# Patient Record
Sex: MALE | Race: ASIAN | NOT HISPANIC OR LATINO | URBAN - METROPOLITAN AREA
[De-identification: names, ages, dates, MRNs, and addresses within clinical notes are randomized per-mention and may not be internally consistent; named-entity substitution may affect disease eponyms.]

---

## 2019-09-21 ENCOUNTER — EMERGENCY (EMERGENCY)
Facility: HOSPITAL | Age: 54
LOS: 1 days | Discharge: HOME | End: 2019-09-21
Attending: EMERGENCY MEDICINE
Payer: MEDICAID

## 2019-09-21 VITALS
HEART RATE: 126 BPM | TEMPERATURE: 103 F | OXYGEN SATURATION: 94 % | RESPIRATION RATE: 18 BRPM | WEIGHT: 207.01 LBS | DIASTOLIC BLOOD PRESSURE: 78 MMHG | SYSTOLIC BLOOD PRESSURE: 122 MMHG

## 2019-09-21 VITALS
HEART RATE: 99 BPM | SYSTOLIC BLOOD PRESSURE: 108 MMHG | OXYGEN SATURATION: 99 % | DIASTOLIC BLOOD PRESSURE: 67 MMHG | TEMPERATURE: 99 F | RESPIRATION RATE: 18 BRPM

## 2019-09-21 LAB
ALBUMIN SERPL ELPH-MCNC: 3.8 G/DL — SIGNIFICANT CHANGE UP (ref 3.5–5)
ALP SERPL-CCNC: 153 U/L — HIGH (ref 40–120)
ALT FLD-CCNC: 43 U/L DA — SIGNIFICANT CHANGE UP (ref 10–60)
ANION GAP SERPL CALC-SCNC: 8 MMOL/L — SIGNIFICANT CHANGE UP (ref 5–17)
APPEARANCE UR: ABNORMAL
APTT BLD: 29.9 SEC — SIGNIFICANT CHANGE UP (ref 27.5–36.3)
AST SERPL-CCNC: 29 U/L — SIGNIFICANT CHANGE UP (ref 10–40)
BASOPHILS # BLD AUTO: 0.03 K/UL — SIGNIFICANT CHANGE UP (ref 0–0.2)
BASOPHILS NFR BLD AUTO: 0.4 % — SIGNIFICANT CHANGE UP (ref 0–2)
BILIRUB SERPL-MCNC: 1.1 MG/DL — SIGNIFICANT CHANGE UP (ref 0.2–1.2)
BILIRUB UR-MCNC: NEGATIVE — SIGNIFICANT CHANGE UP
BUN SERPL-MCNC: 8 MG/DL — SIGNIFICANT CHANGE UP (ref 7–18)
CALCIUM SERPL-MCNC: 8.6 MG/DL — SIGNIFICANT CHANGE UP (ref 8.4–10.5)
CHLORIDE SERPL-SCNC: 106 MMOL/L — SIGNIFICANT CHANGE UP (ref 96–108)
CO2 SERPL-SCNC: 22 MMOL/L — SIGNIFICANT CHANGE UP (ref 22–31)
COLOR SPEC: YELLOW — SIGNIFICANT CHANGE UP
CREAT SERPL-MCNC: 1.21 MG/DL — SIGNIFICANT CHANGE UP (ref 0.5–1.3)
DIFF PNL FLD: ABNORMAL
EOSINOPHIL # BLD AUTO: 0.03 K/UL — SIGNIFICANT CHANGE UP (ref 0–0.5)
EOSINOPHIL NFR BLD AUTO: 0.4 % — SIGNIFICANT CHANGE UP (ref 0–6)
GLUCOSE SERPL-MCNC: 110 MG/DL — HIGH (ref 70–99)
GLUCOSE UR QL: NEGATIVE — SIGNIFICANT CHANGE UP
HCT VFR BLD CALC: 43.4 % — SIGNIFICANT CHANGE UP (ref 39–50)
HGB BLD-MCNC: 15.1 G/DL — SIGNIFICANT CHANGE UP (ref 13–17)
IMM GRANULOCYTES NFR BLD AUTO: 1.2 % — SIGNIFICANT CHANGE UP (ref 0–1.5)
INR BLD: 1.12 RATIO — SIGNIFICANT CHANGE UP (ref 0.88–1.16)
KETONES UR-MCNC: NEGATIVE — SIGNIFICANT CHANGE UP
LACTATE SERPL-SCNC: 1.9 MMOL/L — SIGNIFICANT CHANGE UP (ref 0.7–2)
LACTATE SERPL-SCNC: 2.4 MMOL/L — HIGH (ref 0.7–2)
LEUKOCYTE ESTERASE UR-ACNC: ABNORMAL
LIDOCAIN IGE QN: 205 U/L — SIGNIFICANT CHANGE UP (ref 73–393)
LYMPHOCYTES # BLD AUTO: 0.58 K/UL — LOW (ref 1–3.3)
LYMPHOCYTES # BLD AUTO: 7 % — LOW (ref 13–44)
MCHC RBC-ENTMCNC: 30.5 PG — SIGNIFICANT CHANGE UP (ref 27–34)
MCHC RBC-ENTMCNC: 34.8 GM/DL — SIGNIFICANT CHANGE UP (ref 32–36)
MCV RBC AUTO: 87.7 FL — SIGNIFICANT CHANGE UP (ref 80–100)
MONOCYTES # BLD AUTO: 0.06 K/UL — SIGNIFICANT CHANGE UP (ref 0–0.9)
MONOCYTES NFR BLD AUTO: 0.7 % — LOW (ref 2–14)
NEUTROPHILS # BLD AUTO: 7.43 K/UL — HIGH (ref 1.8–7.4)
NEUTROPHILS NFR BLD AUTO: 90.3 % — HIGH (ref 43–77)
NITRITE UR-MCNC: NEGATIVE — SIGNIFICANT CHANGE UP
NRBC # BLD: 0 /100 WBCS — SIGNIFICANT CHANGE UP (ref 0–0)
PH UR: 5 — SIGNIFICANT CHANGE UP (ref 5–8)
PLATELET # BLD AUTO: 200 K/UL — SIGNIFICANT CHANGE UP (ref 150–400)
POTASSIUM SERPL-MCNC: 3.5 MMOL/L — SIGNIFICANT CHANGE UP (ref 3.5–5.3)
POTASSIUM SERPL-SCNC: 3.5 MMOL/L — SIGNIFICANT CHANGE UP (ref 3.5–5.3)
PROT SERPL-MCNC: 8 G/DL — SIGNIFICANT CHANGE UP (ref 6–8.3)
PROT UR-MCNC: 30 MG/DL
PROTHROM AB SERPL-ACNC: 12.5 SEC — SIGNIFICANT CHANGE UP (ref 10–12.9)
RBC # BLD: 4.95 M/UL — SIGNIFICANT CHANGE UP (ref 4.2–5.8)
RBC # FLD: 12.3 % — SIGNIFICANT CHANGE UP (ref 10.3–14.5)
SODIUM SERPL-SCNC: 136 MMOL/L — SIGNIFICANT CHANGE UP (ref 135–145)
SP GR SPEC: 1.01 — SIGNIFICANT CHANGE UP (ref 1.01–1.02)
UROBILINOGEN FLD QL: NEGATIVE — SIGNIFICANT CHANGE UP
WBC # BLD: 8.23 K/UL — SIGNIFICANT CHANGE UP (ref 3.8–10.5)
WBC # FLD AUTO: 8.23 K/UL — SIGNIFICANT CHANGE UP (ref 3.8–10.5)

## 2019-09-21 PROCEDURE — 99285 EMERGENCY DEPT VISIT HI MDM: CPT

## 2019-09-21 PROCEDURE — 71045 X-RAY EXAM CHEST 1 VIEW: CPT | Mod: 26

## 2019-09-21 RX ORDER — SODIUM CHLORIDE 9 MG/ML
2900 INJECTION INTRAMUSCULAR; INTRAVENOUS; SUBCUTANEOUS ONCE
Refills: 0 | Status: COMPLETED | OUTPATIENT
Start: 2019-09-21 | End: 2019-09-21

## 2019-09-21 RX ORDER — IBUPROFEN 200 MG
600 TABLET ORAL ONCE
Refills: 0 | Status: COMPLETED | OUTPATIENT
Start: 2019-09-21 | End: 2019-09-21

## 2019-09-21 RX ORDER — CEFEPIME 1 G/1
2000 INJECTION, POWDER, FOR SOLUTION INTRAMUSCULAR; INTRAVENOUS ONCE
Refills: 0 | Status: COMPLETED | OUTPATIENT
Start: 2019-09-21 | End: 2019-09-21

## 2019-09-21 RX ORDER — ACETAMINOPHEN 500 MG
650 TABLET ORAL ONCE
Refills: 0 | Status: DISCONTINUED | OUTPATIENT
Start: 2019-09-21 | End: 2019-09-21

## 2019-09-21 RX ORDER — VANCOMYCIN HCL 1 G
1000 VIAL (EA) INTRAVENOUS ONCE
Refills: 0 | Status: COMPLETED | OUTPATIENT
Start: 2019-09-21 | End: 2019-09-21

## 2019-09-21 RX ADMIN — SODIUM CHLORIDE 2900 MILLILITER(S): 9 INJECTION INTRAMUSCULAR; INTRAVENOUS; SUBCUTANEOUS at 22:14

## 2019-09-21 RX ADMIN — SODIUM CHLORIDE 2900 MILLILITER(S): 9 INJECTION INTRAMUSCULAR; INTRAVENOUS; SUBCUTANEOUS at 18:57

## 2019-09-21 RX ADMIN — Medication 1000 MILLIGRAM(S): at 20:13

## 2019-09-21 RX ADMIN — Medication 250 MILLIGRAM(S): at 18:56

## 2019-09-21 RX ADMIN — Medication 600 MILLIGRAM(S): at 22:13

## 2019-09-21 RX ADMIN — CEFEPIME 2000 MILLIGRAM(S): 1 INJECTION, POWDER, FOR SOLUTION INTRAMUSCULAR; INTRAVENOUS at 22:13

## 2019-09-21 RX ADMIN — Medication 600 MILLIGRAM(S): at 18:57

## 2019-09-21 RX ADMIN — CEFEPIME 100 MILLIGRAM(S): 1 INJECTION, POWDER, FOR SOLUTION INTRAMUSCULAR; INTRAVENOUS at 20:15

## 2019-09-21 NOTE — ED PROVIDER NOTE - PROGRESS NOTE DETAILS
Kunz: work up neg.  lactate improve.  blood in urine s/p biopsy.  hemodynamically stable.  pt po  dx fever.  f/u with pcp. left ambulatory.  return precautions given.

## 2019-09-21 NOTE — ED PROVIDER NOTE - PATIENT PORTAL LINK FT
You can access the FollowMyHealth Patient Portal offered by [facility] by registering at the following website: [email or web address]. By joining [facility]’s FollowMyHealth portal, you will also be able to view your health information using other applications (apps) compatible with our system.

## 2019-09-22 ENCOUNTER — INPATIENT (INPATIENT)
Facility: HOSPITAL | Age: 54
LOS: 3 days | Discharge: HOME | DRG: 862 | End: 2019-09-26
Attending: INTERNAL MEDICINE | Admitting: INTERNAL MEDICINE
Payer: COMMERCIAL

## 2019-09-22 VITALS
HEART RATE: 111 BPM | SYSTOLIC BLOOD PRESSURE: 132 MMHG | RESPIRATION RATE: 22 BRPM | OXYGEN SATURATION: 97 % | TEMPERATURE: 103 F | DIASTOLIC BLOOD PRESSURE: 77 MMHG | WEIGHT: 207.01 LBS | HEIGHT: 66 IN

## 2019-09-22 DIAGNOSIS — Z29.9 ENCOUNTER FOR PROPHYLACTIC MEASURES, UNSPECIFIED: ICD-10-CM

## 2019-09-22 DIAGNOSIS — R78.81 BACTEREMIA: ICD-10-CM

## 2019-09-22 DIAGNOSIS — M10.9 GOUT, UNSPECIFIED: ICD-10-CM

## 2019-09-22 DIAGNOSIS — E78.5 HYPERLIPIDEMIA, UNSPECIFIED: ICD-10-CM

## 2019-09-22 DIAGNOSIS — N42.9 DISORDER OF PROSTATE, UNSPECIFIED: ICD-10-CM

## 2019-09-22 LAB
ALBUMIN SERPL ELPH-MCNC: 3.4 G/DL — LOW (ref 3.5–5)
ALP SERPL-CCNC: 100 U/L — SIGNIFICANT CHANGE UP (ref 40–120)
ALT FLD-CCNC: 59 U/L DA — SIGNIFICANT CHANGE UP (ref 10–60)
ANION GAP SERPL CALC-SCNC: 7 MMOL/L — SIGNIFICANT CHANGE UP (ref 5–17)
APPEARANCE UR: CLEAR — SIGNIFICANT CHANGE UP
APTT BLD: 34.4 SEC — SIGNIFICANT CHANGE UP (ref 27.5–36.3)
AST SERPL-CCNC: 45 U/L — HIGH (ref 10–40)
BACTERIA # UR AUTO: ABNORMAL /HPF
BASOPHILS # BLD AUTO: 0.04 K/UL — SIGNIFICANT CHANGE UP (ref 0–0.2)
BASOPHILS NFR BLD AUTO: 0.3 % — SIGNIFICANT CHANGE UP (ref 0–2)
BILIRUB SERPL-MCNC: 2.1 MG/DL — HIGH (ref 0.2–1.2)
BILIRUB UR-MCNC: NEGATIVE — SIGNIFICANT CHANGE UP
BUN SERPL-MCNC: 11 MG/DL — SIGNIFICANT CHANGE UP (ref 7–18)
CALCIUM SERPL-MCNC: 8.1 MG/DL — LOW (ref 8.4–10.5)
CHLORIDE SERPL-SCNC: 104 MMOL/L — SIGNIFICANT CHANGE UP (ref 96–108)
CO2 SERPL-SCNC: 24 MMOL/L — SIGNIFICANT CHANGE UP (ref 22–31)
COLOR SPEC: YELLOW — SIGNIFICANT CHANGE UP
CREAT SERPL-MCNC: 1.27 MG/DL — SIGNIFICANT CHANGE UP (ref 0.5–1.3)
DIFF PNL FLD: ABNORMAL
E COLI DNA BLD POS QL NAA+NON-PROBE: SIGNIFICANT CHANGE UP
EOSINOPHIL # BLD AUTO: 0.03 K/UL — SIGNIFICANT CHANGE UP (ref 0–0.5)
EOSINOPHIL NFR BLD AUTO: 0.2 % — SIGNIFICANT CHANGE UP (ref 0–6)
EPI CELLS # UR: ABNORMAL /HPF
GLUCOSE SERPL-MCNC: 107 MG/DL — HIGH (ref 70–99)
GLUCOSE UR QL: NEGATIVE — SIGNIFICANT CHANGE UP
GRAM STN FLD: SIGNIFICANT CHANGE UP
HCT VFR BLD CALC: 40.3 % — SIGNIFICANT CHANGE UP (ref 39–50)
HGB BLD-MCNC: 13.8 G/DL — SIGNIFICANT CHANGE UP (ref 13–17)
IMM GRANULOCYTES NFR BLD AUTO: 0.5 % — SIGNIFICANT CHANGE UP (ref 0–1.5)
INR BLD: 1.44 RATIO — HIGH (ref 0.88–1.16)
KETONES UR-MCNC: NEGATIVE — SIGNIFICANT CHANGE UP
LACTATE SERPL-SCNC: 1.5 MMOL/L — SIGNIFICANT CHANGE UP (ref 0.7–2)
LEUKOCYTE ESTERASE UR-ACNC: ABNORMAL
LYMPHOCYTES # BLD AUTO: 1.54 K/UL — SIGNIFICANT CHANGE UP (ref 1–3.3)
LYMPHOCYTES # BLD AUTO: 11.1 % — LOW (ref 13–44)
MCHC RBC-ENTMCNC: 30.3 PG — SIGNIFICANT CHANGE UP (ref 27–34)
MCHC RBC-ENTMCNC: 34.2 GM/DL — SIGNIFICANT CHANGE UP (ref 32–36)
MCV RBC AUTO: 88.4 FL — SIGNIFICANT CHANGE UP (ref 80–100)
METHOD TYPE: SIGNIFICANT CHANGE UP
MONOCYTES # BLD AUTO: 1.17 K/UL — HIGH (ref 0–0.9)
MONOCYTES NFR BLD AUTO: 8.4 % — SIGNIFICANT CHANGE UP (ref 2–14)
NEUTROPHILS # BLD AUTO: 11.06 K/UL — HIGH (ref 1.8–7.4)
NEUTROPHILS NFR BLD AUTO: 79.5 % — HIGH (ref 43–77)
NITRITE UR-MCNC: NEGATIVE — SIGNIFICANT CHANGE UP
NRBC # BLD: 0 /100 WBCS — SIGNIFICANT CHANGE UP (ref 0–0)
PH UR: 6 — SIGNIFICANT CHANGE UP (ref 5–8)
PLATELET # BLD AUTO: 149 K/UL — LOW (ref 150–400)
POTASSIUM SERPL-MCNC: 3.5 MMOL/L — SIGNIFICANT CHANGE UP (ref 3.5–5.3)
POTASSIUM SERPL-SCNC: 3.5 MMOL/L — SIGNIFICANT CHANGE UP (ref 3.5–5.3)
PROT SERPL-MCNC: 7.5 G/DL — SIGNIFICANT CHANGE UP (ref 6–8.3)
PROT UR-MCNC: NEGATIVE — SIGNIFICANT CHANGE UP
PROTHROM AB SERPL-ACNC: 16.2 SEC — HIGH (ref 10–12.9)
RBC # BLD: 4.56 M/UL — SIGNIFICANT CHANGE UP (ref 4.2–5.8)
RBC # FLD: 12.7 % — SIGNIFICANT CHANGE UP (ref 10.3–14.5)
RBC CASTS # UR COMP ASSIST: ABNORMAL /HPF (ref 0–2)
SODIUM SERPL-SCNC: 135 MMOL/L — SIGNIFICANT CHANGE UP (ref 135–145)
SP GR SPEC: 1 — LOW (ref 1.01–1.02)
SPECIMEN SOURCE: SIGNIFICANT CHANGE UP
UROBILINOGEN FLD QL: NEGATIVE — SIGNIFICANT CHANGE UP
WBC # BLD: 13.91 K/UL — HIGH (ref 3.8–10.5)
WBC # FLD AUTO: 13.91 K/UL — HIGH (ref 3.8–10.5)
WBC UR QL: ABNORMAL /HPF (ref 0–5)

## 2019-09-22 PROCEDURE — 87040 BLOOD CULTURE FOR BACTERIA: CPT

## 2019-09-22 PROCEDURE — 85610 PROTHROMBIN TIME: CPT

## 2019-09-22 PROCEDURE — 99284 EMERGENCY DEPT VISIT MOD MDM: CPT | Mod: 25

## 2019-09-22 PROCEDURE — 87150 DNA/RNA AMPLIFIED PROBE: CPT

## 2019-09-22 PROCEDURE — 96365 THER/PROPH/DIAG IV INF INIT: CPT

## 2019-09-22 PROCEDURE — 99285 EMERGENCY DEPT VISIT HI MDM: CPT

## 2019-09-22 PROCEDURE — 83605 ASSAY OF LACTIC ACID: CPT

## 2019-09-22 PROCEDURE — 36415 COLL VENOUS BLD VENIPUNCTURE: CPT

## 2019-09-22 PROCEDURE — 96367 TX/PROPH/DG ADDL SEQ IV INF: CPT

## 2019-09-22 PROCEDURE — 81001 URINALYSIS AUTO W/SCOPE: CPT

## 2019-09-22 PROCEDURE — 87186 SC STD MICRODIL/AGAR DIL: CPT

## 2019-09-22 PROCEDURE — 83690 ASSAY OF LIPASE: CPT

## 2019-09-22 PROCEDURE — 93005 ELECTROCARDIOGRAM TRACING: CPT

## 2019-09-22 PROCEDURE — 85027 COMPLETE CBC AUTOMATED: CPT

## 2019-09-22 PROCEDURE — 80053 COMPREHEN METABOLIC PANEL: CPT

## 2019-09-22 PROCEDURE — 85730 THROMBOPLASTIN TIME PARTIAL: CPT

## 2019-09-22 PROCEDURE — 87086 URINE CULTURE/COLONY COUNT: CPT

## 2019-09-22 PROCEDURE — 71045 X-RAY EXAM CHEST 1 VIEW: CPT

## 2019-09-22 RX ORDER — ACETAMINOPHEN 500 MG
650 TABLET ORAL EVERY 6 HOURS
Refills: 0 | Status: DISCONTINUED | OUTPATIENT
Start: 2019-09-22 | End: 2019-09-26

## 2019-09-22 RX ORDER — AMPICILLIN SODIUM AND SULBACTAM SODIUM 250; 125 MG/ML; MG/ML
3 INJECTION, POWDER, FOR SUSPENSION INTRAMUSCULAR; INTRAVENOUS ONCE
Refills: 0 | Status: COMPLETED | OUTPATIENT
Start: 2019-09-22 | End: 2019-09-22

## 2019-09-22 RX ORDER — CEFTRIAXONE 500 MG/1
1000 INJECTION, POWDER, FOR SOLUTION INTRAMUSCULAR; INTRAVENOUS EVERY 24 HOURS
Refills: 0 | Status: DISCONTINUED | OUTPATIENT
Start: 2019-09-22 | End: 2019-09-24

## 2019-09-22 RX ORDER — SODIUM CHLORIDE 9 MG/ML
2900 INJECTION INTRAMUSCULAR; INTRAVENOUS; SUBCUTANEOUS ONCE
Refills: 0 | Status: COMPLETED | OUTPATIENT
Start: 2019-09-22 | End: 2019-09-22

## 2019-09-22 RX ORDER — ENOXAPARIN SODIUM 100 MG/ML
40 INJECTION SUBCUTANEOUS DAILY
Refills: 0 | Status: DISCONTINUED | OUTPATIENT
Start: 2019-09-22 | End: 2019-09-26

## 2019-09-22 RX ORDER — VANCOMYCIN HCL 1 G
1000 VIAL (EA) INTRAVENOUS ONCE
Refills: 0 | Status: COMPLETED | OUTPATIENT
Start: 2019-09-22 | End: 2019-09-22

## 2019-09-22 RX ORDER — KETOROLAC TROMETHAMINE 30 MG/ML
30 SYRINGE (ML) INJECTION ONCE
Refills: 0 | Status: DISCONTINUED | OUTPATIENT
Start: 2019-09-22 | End: 2019-09-22

## 2019-09-22 RX ORDER — TRAMADOL HYDROCHLORIDE 50 MG/1
25 TABLET ORAL ONCE
Refills: 0 | Status: DISCONTINUED | OUTPATIENT
Start: 2019-09-22 | End: 2019-09-22

## 2019-09-22 RX ADMIN — TRAMADOL HYDROCHLORIDE 25 MILLIGRAM(S): 50 TABLET ORAL at 19:31

## 2019-09-22 RX ADMIN — SODIUM CHLORIDE 2900 MILLILITER(S): 9 INJECTION INTRAMUSCULAR; INTRAVENOUS; SUBCUTANEOUS at 16:29

## 2019-09-22 RX ADMIN — Medication 250 MILLIGRAM(S): at 20:04

## 2019-09-22 RX ADMIN — TRAMADOL HYDROCHLORIDE 25 MILLIGRAM(S): 50 TABLET ORAL at 21:19

## 2019-09-22 RX ADMIN — SODIUM CHLORIDE 2900 MILLILITER(S): 9 INJECTION INTRAMUSCULAR; INTRAVENOUS; SUBCUTANEOUS at 15:10

## 2019-09-22 RX ADMIN — Medication 30 MILLIGRAM(S): at 15:56

## 2019-09-22 RX ADMIN — AMPICILLIN SODIUM AND SULBACTAM SODIUM 200 GRAM(S): 250; 125 INJECTION, POWDER, FOR SUSPENSION INTRAMUSCULAR; INTRAVENOUS at 15:55

## 2019-09-22 RX ADMIN — CEFTRIAXONE 100 MILLIGRAM(S): 500 INJECTION, POWDER, FOR SOLUTION INTRAMUSCULAR; INTRAVENOUS at 19:30

## 2019-09-22 NOTE — H&P ADULT - HISTORY OF PRESENT ILLNESS
54 year old male presents to the ED with complaints of headache, fever, and body ache since yesterday. He reports that due to an abnormal MRI of prostrate (now placed on Cipro), he got a prostrate biopsy conducted this Thursday. Patient reports that blood culture was done and he felt better, thus he was discharged. However, today he notes that the fever has come back and that he has been feeling worse since yesterday and he feels upset stomach due to the antibiotics. Blood culture initaial report came in yesterday as positive for gram negative rods. Patient is being admitted for IV antibiotics.   Denies abd pain, photophobia, neck pain, stiffness, or any other acute complaints. NKDA. 54 year old male presents to the ED with complaints of headache, fever, and body ache since yesterday. He reports that due to an abnormal MRI of prostrate (now placed on Cipro), he got a prostrate biopsy conducted this Thursday. Patient was recommended MRI of prostate by urologist because of elevated PSH level. Patient reports that blood culture were done and he felt better, thus he was discharged. However, today he notes that the fever has come back and that he has been feeling worse since yesterday and he feels upset stomach due to the antibiotics. Blood culture initial report came in yesterday as positive for gram negative rods. Patient is being admitted for IV antibiotics.  Denies abd pain, photophobia, neck pain, stiffness, or any other acute complaints. NKDA.

## 2019-09-22 NOTE — ED POST DISCHARGE NOTE - RESULT SUMMARY
+ blood culture, pt called with no answer.  message left for patient to call back.  Will send letter.

## 2019-09-22 NOTE — H&P ADULT - PROBLEM SELECTOR PLAN 4
pt has history of gout for which he takes allopurinol at home.  will continue.  f.u uric acid level in am

## 2019-09-22 NOTE — H&P ADULT - PROBLEM SELECTOR PLAN 1
Pt came in with fever and tachycardia, 3 days after prostate biopsy.  Found to have bacteremia with gram negative rods in 4/4 bottles likely E coli.  Urine cultures are also positive for gram negative rods.  s/p 2.9 L in ED.  mild leukocytosis of 13 k.  1 dose of UNasyn in ED.  Started on IV ceftriaxone to cover for gram negative rods.  1 dose of vancomycin given because patient had gram positive cocci in clusters in 1/4 bottles.  Tylenol prn for fever.  repeat blood cultures in 2 days.  ID consult dr young.  Follow up blood cultures final report and adjust antibiotics based on sensitivities.  ID dr young consult requested

## 2019-09-22 NOTE — ED PROVIDER NOTE - OBJECTIVE STATEMENT
54 year old male presents to the ED with complaints of headache, fever, and body ache since yesterday. He reports that due to an abnormal MRI of prostrate (now placed on Cipro), he got a prostrate biopsy conducted this Thursday. Patient reports that blood culture was done and he felt better, thus he was discharged. However, today he notes that the fever has come back and that he has been feeling worse since yesterday and he feels upset stomach due to the antibiotics. Blood culture came in yesterday as positive. Denies abd pain, photophobia, neck pain, stiffness, or any other acute complaints. NKDA.

## 2019-09-22 NOTE — ED ADULT NURSE NOTE - NSIMPLEMENTINTERV_GEN_ALL_ED
Implemented All Universal Safety Interventions:  [place] to call system. Call bell, personal items and telephone within reach. Instruct patient to call for assistance. Room bathroom lighting operational. Non-slip footwear when patient is off stretcher. Physically safe environment: no spills, clutter or unnecessary equipment. Stretcher in lowest position, wheels locked, appropriate side rails in place.

## 2019-09-22 NOTE — H&P ADULT - ASSESSMENT
54 year old male presents to the ED with complaints of headache, fever, and body ache since yesterday. He reports that due to an abnormal MRI of prostrate (now placed on Cipro), he got a prostrate biopsy conducted this Thursday. Patient reports that blood culture was done and he felt better, thus he was discharged. However, today he notes that the fever has come back and that he has been feeling worse since yesterday and he feels upset stomach due to the antibiotics. Blood culture initaial report came in yesterday as positive for gram negative rods. Patient is being admitted for IV antibiotics.   Denies abd pain, photophobia, neck pain, stiffness, or any other acute complaints. NKDA.

## 2019-09-22 NOTE — H&P ADULT - ATTENDING COMMENTS
pt seen and evaluated  a/w sepsis, bacteremia, uti, s/p recent prostate biopsy  iv abx  f/u cult and sens  ID consult

## 2019-09-22 NOTE — ED PROVIDER NOTE - CLINICAL SUMMARY MEDICAL DECISION MAKING FREE TEXT BOX
Patient with fever and positive blood culture after prostrate biopsy. Already on Cipro. Will admit for antibiotics. Concerned for sepsis.

## 2019-09-22 NOTE — ED ADULT NURSE NOTE - OBJECTIVE STATEMENT
referred by PCP for abnorma lab results, reports having headache and high fever since yesterday code sepsis initialed as per protocol referred by PCP for abnormal lab results, reports having headache and high fever since yesterday code sepsis initialed as per protocol. Pt denies recent travels.

## 2019-09-22 NOTE — H&P ADULT - PROBLEM SELECTOR PLAN 2
pt reports that due to an abnormal MRI of prostrate (now placed on Cipro), he got a prostrate biopsy conducted this Thursday.   Patient was recommended MRI of prostate by urologist because of elevated PSH level.  Patient is currenlty experiencing no urinary symptoms.  Biopsy of prostate results will come on thursday 09/26/2019

## 2019-09-22 NOTE — H&P ADULT - NSHPPHYSICALEXAM_GEN_ALL_CORE
Vital Signs Last 24 Hrs  T(C): 36.8 (22 Sep 2019 15:41), Max: 39.4 (22 Sep 2019 14:42)  T(F): 98.3 (22 Sep 2019 15:41), Max: 103 (22 Sep 2019 14:42)  HR: 102 (22 Sep 2019 15:41) (99 - 111)  BP: 135/88 (22 Sep 2019 15:41) (108/67 - 145/60)  BP(mean): --  RR: 25 (22 Sep 2019 15:41) (16 - 25)  SpO2: 98% (22 Sep 2019 15:41) (97% - 100%)  · Physical Examination: no prostrate tenderness  · CONSTITUTIONAL: Well appearing, well nourished, awake, alert, oriented to person, place, time/situation and in no apparent distress.  · ENMT: Airway patent, Nasal mucosa clear. Mouth with normal mucosa. Throat has no vesicles, no oropharyngeal exudates and uvula is midline.  · EYES: Clear bilaterally, pupils equal, round and reactive to light.  · CARDIAC: Tachycardic, regular rhythm.  Heart sounds S1, S2.  No murmurs, rubs or gallops.  · RESPIRATORY: Breath sounds clear and equal bilaterally.  · GASTROINTESTINAL: Abdomen soft, non-tender, no guarding.  · MUSCULOSKELETAL: Spine appears normal, range of motion is not limited, no muscle or joint tenderness  · NEUROLOGICAL: Alert and oriented, no focal deficits, no motor or sensory deficits.  · SKIN: Skin normal color for race, warm, dry and intact. No evidence of rash.

## 2019-09-22 NOTE — H&P ADULT - PROBLEM SELECTOR PLAN 5
IMPROVE VTE Individual Risk Assessment  RISK                                                          Points  [] Previous VTE                                           3  [] Thrombophilia                                        2  [] Lower limb paralysis                              2   [] Current Cancer                                       2   [] Immobilization > 24 hrs                        1  [] ICU/CCU stay > 24 hours                       1  [] Age > 60                                                   1  IMPROVE VTE Score = 1  lovenox 40 mg

## 2019-09-23 DIAGNOSIS — E78.6 LIPOPROTEIN DEFICIENCY: ICD-10-CM

## 2019-09-23 LAB
-  AMIKACIN: SIGNIFICANT CHANGE UP
-  AMPICILLIN/SULBACTAM: SIGNIFICANT CHANGE UP
-  AMPICILLIN: SIGNIFICANT CHANGE UP
-  AZTREONAM: SIGNIFICANT CHANGE UP
-  CEFAZOLIN: SIGNIFICANT CHANGE UP
-  CEFEPIME: SIGNIFICANT CHANGE UP
-  CEFOXITIN: SIGNIFICANT CHANGE UP
-  CEFTRIAXONE: SIGNIFICANT CHANGE UP
-  CIPROFLOXACIN: SIGNIFICANT CHANGE UP
-  ERTAPENEM: SIGNIFICANT CHANGE UP
-  GENTAMICIN: SIGNIFICANT CHANGE UP
-  IMIPENEM: SIGNIFICANT CHANGE UP
-  LEVOFLOXACIN: SIGNIFICANT CHANGE UP
-  MEROPENEM: SIGNIFICANT CHANGE UP
-  NITROFURANTOIN: SIGNIFICANT CHANGE UP
-  PIPERACILLIN/TAZOBACTAM: SIGNIFICANT CHANGE UP
-  TIGECYCLINE: SIGNIFICANT CHANGE UP
-  TOBRAMYCIN: SIGNIFICANT CHANGE UP
-  TRIMETHOPRIM/SULFAMETHOXAZOLE: SIGNIFICANT CHANGE UP
24R-OH-CALCIDIOL SERPL-MCNC: 17.6 NG/ML — LOW (ref 30–80)
ALBUMIN SERPL ELPH-MCNC: 2.6 G/DL — LOW (ref 3.5–5)
ALP SERPL-CCNC: 80 U/L — SIGNIFICANT CHANGE UP (ref 40–120)
ALT FLD-CCNC: 44 U/L DA — SIGNIFICANT CHANGE UP (ref 10–60)
ANION GAP SERPL CALC-SCNC: 6 MMOL/L — SIGNIFICANT CHANGE UP (ref 5–17)
AST SERPL-CCNC: 37 U/L — SIGNIFICANT CHANGE UP (ref 10–40)
BASOPHILS # BLD AUTO: 0 K/UL — SIGNIFICANT CHANGE UP (ref 0–0.2)
BASOPHILS NFR BLD AUTO: 0 % — SIGNIFICANT CHANGE UP (ref 0–2)
BILIRUB DIRECT SERPL-MCNC: 0.6 MG/DL — HIGH (ref 0–0.2)
BILIRUB INDIRECT FLD-MCNC: 1 MG/DL — SIGNIFICANT CHANGE UP (ref 0.2–1)
BILIRUB SERPL-MCNC: 1.6 MG/DL — HIGH (ref 0.2–1.2)
BUN SERPL-MCNC: 12 MG/DL — SIGNIFICANT CHANGE UP (ref 7–18)
CALCIUM SERPL-MCNC: 7.5 MG/DL — LOW (ref 8.4–10.5)
CHLORIDE SERPL-SCNC: 105 MMOL/L — SIGNIFICANT CHANGE UP (ref 96–108)
CHOLEST SERPL-MCNC: 88 MG/DL — SIGNIFICANT CHANGE UP (ref 10–199)
CO2 SERPL-SCNC: 25 MMOL/L — SIGNIFICANT CHANGE UP (ref 22–31)
CREAT SERPL-MCNC: 1.22 MG/DL — SIGNIFICANT CHANGE UP (ref 0.5–1.3)
CULTURE RESULTS: SIGNIFICANT CHANGE UP
EOSINOPHIL # BLD AUTO: 0 K/UL — SIGNIFICANT CHANGE UP (ref 0–0.5)
EOSINOPHIL NFR BLD AUTO: 0 % — SIGNIFICANT CHANGE UP (ref 0–6)
GLUCOSE SERPL-MCNC: 131 MG/DL — HIGH (ref 70–99)
HBA1C BLD-MCNC: 5.8 % — HIGH (ref 4–5.6)
HCT VFR BLD CALC: 34.9 % — LOW (ref 39–50)
HDLC SERPL-MCNC: 14 MG/DL — LOW
HGB BLD-MCNC: 11.9 G/DL — LOW (ref 13–17)
LACTATE SERPL-SCNC: 1.2 MMOL/L — SIGNIFICANT CHANGE UP (ref 0.7–2)
LIPID PNL WITH DIRECT LDL SERPL: 47 MG/DL — SIGNIFICANT CHANGE UP
LYMPHOCYTES # BLD AUTO: 16 % — SIGNIFICANT CHANGE UP (ref 13–44)
LYMPHOCYTES # BLD AUTO: 2.11 K/UL — SIGNIFICANT CHANGE UP (ref 1–3.3)
MAGNESIUM SERPL-MCNC: 1.7 MG/DL — SIGNIFICANT CHANGE UP (ref 1.6–2.6)
MCHC RBC-ENTMCNC: 30.1 PG — SIGNIFICANT CHANGE UP (ref 27–34)
MCHC RBC-ENTMCNC: 34.1 GM/DL — SIGNIFICANT CHANGE UP (ref 32–36)
MCV RBC AUTO: 88.1 FL — SIGNIFICANT CHANGE UP (ref 80–100)
METHOD TYPE: SIGNIFICANT CHANGE UP
MONOCYTES # BLD AUTO: 0.92 K/UL — HIGH (ref 0–0.9)
MONOCYTES NFR BLD AUTO: 7 % — SIGNIFICANT CHANGE UP (ref 2–14)
NEUTROPHILS # BLD AUTO: 10.15 K/UL — HIGH (ref 1.8–7.4)
NEUTROPHILS NFR BLD AUTO: 77 % — SIGNIFICANT CHANGE UP (ref 43–77)
ORGANISM # SPEC MICROSCOPIC CNT: SIGNIFICANT CHANGE UP
ORGANISM # SPEC MICROSCOPIC CNT: SIGNIFICANT CHANGE UP
PHOSPHATE SERPL-MCNC: 1.8 MG/DL — LOW (ref 2.5–4.5)
PLATELET # BLD AUTO: 126 K/UL — LOW (ref 150–400)
POTASSIUM SERPL-MCNC: 3.3 MMOL/L — LOW (ref 3.5–5.3)
POTASSIUM SERPL-SCNC: 3.3 MMOL/L — LOW (ref 3.5–5.3)
PROT SERPL-MCNC: 6.2 G/DL — SIGNIFICANT CHANGE UP (ref 6–8.3)
RBC # BLD: 3.96 M/UL — LOW (ref 4.2–5.8)
RBC # FLD: 12.6 % — SIGNIFICANT CHANGE UP (ref 10.3–14.5)
SODIUM SERPL-SCNC: 136 MMOL/L — SIGNIFICANT CHANGE UP (ref 135–145)
SPECIMEN SOURCE: SIGNIFICANT CHANGE UP
TOTAL CHOLESTEROL/HDL RATIO MEASUREMENT: 6.3 RATIO — SIGNIFICANT CHANGE UP (ref 3.4–9.6)
TRIGL SERPL-MCNC: 135 MG/DL — SIGNIFICANT CHANGE UP (ref 10–149)
TSH SERPL-MCNC: 0.36 UU/ML — SIGNIFICANT CHANGE UP (ref 0.34–4.82)
URATE SERPL-MCNC: 4.6 MG/DL — SIGNIFICANT CHANGE UP (ref 3.4–8.8)
VIT B12 SERPL-MCNC: <150 PG/ML — LOW (ref 232–1245)
WBC # BLD: 13.18 K/UL — HIGH (ref 3.8–10.5)
WBC # FLD AUTO: 13.18 K/UL — HIGH (ref 3.8–10.5)

## 2019-09-23 RX ORDER — POTASSIUM CHLORIDE 20 MEQ
40 PACKET (EA) ORAL ONCE
Refills: 0 | Status: DISCONTINUED | OUTPATIENT
Start: 2019-09-23 | End: 2019-09-23

## 2019-09-23 RX ORDER — POTASSIUM PHOSPHATE, MONOBASIC POTASSIUM PHOSPHATE, DIBASIC 236; 224 MG/ML; MG/ML
30 INJECTION, SOLUTION INTRAVENOUS ONCE
Refills: 0 | Status: COMPLETED | OUTPATIENT
Start: 2019-09-23 | End: 2019-09-23

## 2019-09-23 RX ADMIN — ENOXAPARIN SODIUM 40 MILLIGRAM(S): 100 INJECTION SUBCUTANEOUS at 11:59

## 2019-09-23 RX ADMIN — CEFTRIAXONE 100 MILLIGRAM(S): 500 INJECTION, POWDER, FOR SOLUTION INTRAMUSCULAR; INTRAVENOUS at 18:48

## 2019-09-23 RX ADMIN — Medication 650 MILLIGRAM(S): at 06:58

## 2019-09-23 RX ADMIN — POTASSIUM PHOSPHATE, MONOBASIC POTASSIUM PHOSPHATE, DIBASIC 62.5 MILLIMOLE(S): 236; 224 INJECTION, SOLUTION INTRAVENOUS at 11:58

## 2019-09-23 RX ADMIN — Medication 650 MILLIGRAM(S): at 05:31

## 2019-09-23 NOTE — CONSULT NOTE ADULT - ASSESSMENT
Acute Prostatitis post biopsy  Bacteremia with E.Coli  Fevers  Leukocytosis    Plan - Cont Rocephin 1 gm iv q24 hrs   await sensitivity of E.Coli  repeat blood culture tomorrow.

## 2019-09-23 NOTE — PROGRESS NOTE ADULT - PROBLEM SELECTOR PLAN 1
E. Coli Bacteremia likely due to recent prostate Bx   -T-max 102.2, WBC 13.1  -blood culture 9/21-> gram negative rods in 4/4 bottles likely E coli.  -Urine culture 9/21->gram negative rods  -Cont Rocephin for now  -ID Dr. Luo consulted, will follow reccs  -f/u 9/24 am repeat blood cultures   -Tylenol prn for fever.

## 2019-09-23 NOTE — PROGRESS NOTE ADULT - SUBJECTIVE AND OBJECTIVE BOX
LANE BECKETT  54y Male  MRN:[id number]    Patient is a 54y old  Male who presents with a chief complaint of bacteremia s/p prostate biopsy (22 Sep 2019 18:05)    HPI:  54 year old male presents to the ED with complaints of headache, fever, and body ache since yesterday. He reports that due to an abnormal MRI of prostrate (now placed on Cipro), he got a prostrate biopsy conducted this Thursday. Patient was recommended MRI of prostate by urologist because of elevated PSH level. Patient reports that blood culture were done and he felt better, thus he was discharged. However, today he notes that the fever has come back and that he has been feeling worse since yesterday and he feels upset stomach due to the antibiotics. Blood culture initial report came in yesterday as positive for gram negative rods. Patient is being admitted for IV antibiotics.  Denies abd pain, photophobia, neck pain, stiffness, or any other acute complaints. NKDA. (22 Sep 2019 18:05)      Patient seen and evaluated at bedside. No acute events overnight except as noted.    Interval HPI: no events o/n    PAST MEDICAL & SURGICAL HISTORY:  No pertinent past medical history      REVIEW OF SYSTEMS:  as per hpi    VITALS:  Vital Signs Last 24 Hrs  T(C): 37.9 (23 Sep 2019 06:58), Max: 39.4 (22 Sep 2019 14:42)  T(F): 100.3 (23 Sep 2019 06:58), Max: 103 (22 Sep 2019 14:42)  HR: 102 (23 Sep 2019 05:29) (84 - 111)  BP: 123/69 (23 Sep 2019 05:29) (102/60 - 145/60)  BP(mean): --  RR: 18 (23 Sep 2019 05:29) (18 - 25)  SpO2: 99% (23 Sep 2019 05:29) (97% - 100%)  CAPILLARY BLOOD GLUCOSE        I&O's Summary      PHYSICAL EXAM:  GENERAL: NAD, well-developed  HEAD:  Atraumatic, Normocephalic  EYES: EOMI, PERRLA, conjunctiva and sclera clear  NECK: Supple, No JVD  CHEST/LUNG: Clear to auscultation bilaterally; No wheeze  HEART: S1, S2; No murmurs, rubs, or gallops  ABDOMEN: Soft, Nontender, Nondistended; Bowel sounds present  EXTREMITIES:  2+ Peripheral Pulses, No clubbing, cyanosis, or edema  PSYCH: Normal affect  NEUROLOGY: AAOX3; non-focal  SKIN: No rashes or lesions    Consultant(s) Notes Reviewed:  [x ] YES  [ ] NO  Care Discussed with Consultants/Other Providers [ x] YES  [ ] NO    MEDS:  MEDICATIONS  (STANDING):  cefTRIAXone   IVPB 1000 milliGRAM(s) IV Intermittent every 24 hours  enoxaparin Injectable 40 milliGRAM(s) SubCutaneous daily    MEDICATIONS  (PRN):  acetaminophen   Tablet .. 650 milliGRAM(s) Oral every 6 hours PRN Temp greater or equal to 38C (100.4F), Mild Pain (1 - 3), Moderate Pain (4 - 6)    ALLERGIES:  No Known Allergies      LABS:                        11.9   13.18 )-----------( 126      ( 23 Sep 2019 06:27 )             34.9         136  |  105  |  12  ----------------------------<  131<H>  3.3<L>   |  25  |  1.22    Ca    7.5<L>      23 Sep 2019 06:27  Phos  1.8       Mg     1.7         TPro  6.2  /  Alb  2.6<L>  /  TBili  1.6<H>  /  DBili  0.6<H>  /  AST  37  /  ALT  44  /  AlkPhos  80      PT/INR - ( 22 Sep 2019 15:23 )   PT: 16.2 sec;   INR: 1.44 ratio         PTT - ( 22 Sep 2019 15:23 )  PTT:34.4 sec      LIVER FUNCTIONS - ( 23 Sep 2019 06:27 )  Alb: 2.6 g/dL / Pro: 6.2 g/dL / ALK PHOS: 80 U/L / ALT: 44 U/L DA / AST: 37 U/L / GGT: x           Urinalysis Basic - ( 22 Sep 2019 18:53 )    Color: Yellow / Appearance: Clear / S.005 / pH: x  Gluc: x / Ketone: Negative  / Bili: Negative / Urobili: Negative   Blood: x / Protein: Negative / Nitrite: Negative   Leuk Esterase: Small / RBC: 10-25 /HPF / WBC 11-25 /HPF   Sq Epi: x / Non Sq Epi: Occasional /HPF / Bacteria: Trace /HPF      TSH: Thyroid Stimulating Hormone, Serum: 0.36 uU/mL ( @ 06:27)    A1c:Hemoglobin A1C, Whole Blood: 5.8 % ( @ 11:25)    BNP:  Lipid panel:   cultures: Culture Results:   Growth in aerobic and anaerobic bottles: Escherichia coli  "Due to technical problems, Proteus sp. will Not be reported as part of  the BCID panel until further notice"  ***Blood Panel PCR results on this specimen are available  approximately 3 hours after the Gram stain result.***  Gram stain, PCR, and/or culture results may not always  correspond due to difference in methodologies.  ************************************************************  This PCR assay was performed using [place].  The following targets are tested for: Enterococcus,  vancomycin resistant enterococci, Listeria monocytogenes,  coagulase negative staphylococci, S. aureus,  methicillin resistant S. aureus, Streptococcus agalactiae  (Group B), S. pneumoniae, S.pyogenes (Group A),  Acinetobacter baumannii, Enterobacter cloacae, E. coli,  Klebsiella oxytoca, K. pneumoniae, Proteus sp.,  Serratia marcescens, Haemophilus influenzae,  Neisseria meningitidis, Pseudomonas aeruginosa, Candida  albicans, C. glabrata, C krusei, C parapsilosis,  C. tropicalis and the KPC resistance gene. ( @ 22:16)  Culture Results:   **Please Note**: This is a Corrected Report**  Growth in aerobic and anaerobic bottles: Escherichia coli See previous  culture [id number]    Previously reported as:  Growth in aerobic bottle: Gram Positive Cocci in Clusters ( @ 22:15)  Culture Results:   10,000 - 49,000 CFU/mL Gram Negative Rods ( @ 22:06)      RADIOLOGY & ADDITIONAL TESTS:    Imaging Personally Reviewed:  [ ] YES  [ ] NO

## 2019-09-23 NOTE — CONSULT NOTE ADULT - RS GEN PE MLT RESP DETAILS PC
no rales/clear to auscultation bilaterally/no rhonchi/breath sounds equal/no wheezes/good air movement

## 2019-09-23 NOTE — CONSULT NOTE ADULT - SUBJECTIVE AND OBJECTIVE BOX
HPI:  54 year old male presents to the ED with complaints of headache, fever, and body ache since yesterday. He reports that due to an abnormal MRI of prostrate (now placed on Cipro), he got a prostrate biopsy conducted this Thursday. Patient was recommended MRI of prostate by urologist because of elevated PSH level. Patient reports that blood culture were done and he felt better, thus he was discharged. However, today he notes that the fever has come back and that he has been feeling worse since yesterday and he feels upset stomach due to the antibiotics. Blood culture initial report came in yesterday as positive for gram negative rods. Patient is being admitted for IV antibiotics.  Denies abd pain, photophobia, neck pain, stiffness, or any other acute complaints. NKDA. (22 Sep 2019 18:05)      PAST MEDICAL & SURGICAL HISTORY:  No pertinent past medical history      No Known Allergies      Meds:  acetaminophen   Tablet .. 650 milliGRAM(s) Oral every 6 hours PRN  cefTRIAXone   IVPB 1000 milliGRAM(s) IV Intermittent every 24 hours  enoxaparin Injectable 40 milliGRAM(s) SubCutaneous daily      SOCIAL HISTORY:  Smoker:  YES / NO        PACK YEARS:                         WHEN QUIT?  ETOH use:  YES / NO               FREQUENCY / QUANTITY:  Ilic[place] Drug use:  YES / NO  Occupation:  Assisted device use (Cane / Walker):  Live with:    FAMILY HISTORY:      VITALS:  Vital Signs Last 24 Hrs  T(C): 37.1 (23 Sep 2019 14:57), Max: 39 (23 Sep 2019 05:29)  T(F): 98.7 (23 Sep 2019 14:57), Max: 102.2 (23 Sep 2019 05:29)  HR: 80 (23 Sep 2019 14:57) (80 - 102)  BP: 105/60 (23 Sep 2019 14:57) (102/60 - 137/73)  BP(mean): --  RR: 18 (23 Sep 2019 14:57) (18 - 18)  SpO2: 99% (23 Sep 2019 14:57) (99% - 100%)    LABS/DIAGNOSTIC TESTS:                          11.9   13.18 )-----------( 126      ( 23 Sep 2019 06:27 )             34.9     WBC Count: 13.18 K/uL ( @ 06:27)  WBC Count: 13.91 K/uL ( @ 15:23)  WBC Count: 8.23 K/uL ( @ 18:45)          136  |  105  |  12  ----------------------------<  131<H>  3.3<L>   |  25  |  1.22    Ca    7.5<L>      23 Sep 2019 06:27  Phos  1.8       Mg     1.7         TPro  6.2  /  Alb  2.6<L>  /  TBili  1.6<H>  /  DBili  0.6<H>  /  AST  37  /  ALT  44  /  AlkPhos  80        Urinalysis Basic - ( 22 Sep 2019 18:53 )    Color: Yellow / Appearance: Clear / S.005 / pH: x  Gluc: x / Ketone: Negative  / Bili: Negative / Urobili: Negative   Blood: x / Protein: Negative / Nitrite: Negative   Leuk Esterase: Small / RBC: 10-25 /HPF / WBC 11-25 /HPF   Sq Epi: x / Non Sq Epi: Occasional /HPF / Bacteria: Trace /HPF        LIVER FUNCTIONS - ( 23 Sep 2019 06:27 )  Alb: 2.6 g/dL / Pro: 6.2 g/dL / ALK PHOS: 80 U/L / ALT: 44 U/L DA / AST: 37 U/L / GGT: x             PT/INR - ( 22 Sep 2019 15:23 )   PT: 16.2 sec;   INR: 1.44 ratio         PTT - ( 22 Sep 2019 15:23 )  PTT:34.4 sec    LACTATE:Lactate, Blood: 1.2 mmol/L ( @ 06:27)      ABG -     CULTURES:   .Blood   @ 22:16   Growth in aerobic and anaerobic bottles: Escherichia coli      .Blood   @ 22:15   **Please Note**: This is a Corrected Report**  Growth in aerobic and anaerobic bottles: Escherichia coli See previous  culture [id number]    Previously reported as:  Growth in aerobic bottle: Gram Positive Cocci in Clusters  --    **Please Note**: This is a Corrected Report**  Growth in aerobic bottle: Gram Negative Rods  Previously reported as:  Growth in aerobic bottle: Gram Positive Cocci in Clusters  Growth in anaerobic bottle: Gram Negative Rods      .Urine   @ 22:06   10,000 - 49,000 CFU/mL Gram Negative Rods  --  --          RADIOLOGY:< from: Xray Chest 1 View AP/PA (19 @ 18:51) >  EXAM:  XR CHEST AP OR PA 1V                            PROCEDURE DATE:  2019          INTERPRETATION:  PROCEDURE: AP view of the chest.    CLINICAL INFORMATION: Sepsis.    COMPARISON: None.    FINDINGS:    Lungs: The lungs are clear.  Heart: The heart is top normal in size.  Mediastinum: The mediastinum is within normal limits.    IMPRESSION:    Clear lungs.      < end of copied text >        ROS  [  ] UNABLE TO ELICIT HPI:  54 year old male presents to the ED with complaints of headache, fever, and body ache since yesterday. He reports that due to an abnormal MRI of prostrate (now placed on Cipro), he got a prostrate biopsy conducted this Thursday. Patient was recommended MRI of prostate by urologist because of elevated PSH level. Patient reports that blood culture were done and he felt better, thus he was discharged. However, today he notes that the fever has come back and that he has been feeling worse since yesterday and he feels upset stomach due to the antibiotics. Blood culture initial report came in yesterday as positive for gram negative rods. Patient is being admitted for IV antibiotics.  Denies abd pain, photophobia, neck pain, stiffness, or any other acute complaints. NKDA. (22 Sep 2019 18:05)    History as above, pt developed fevers , chills , dysuria and urgency along with abdominal  pain and  was found to be bacteremic with E.Coli . He is on Rocephin and is clinically doing better , he still has some dysuria but has no urinary retention or other active complaints currently.      PAST MEDICAL & SURGICAL HISTORY:  No pertinent past medical history      No Known Allergies      Meds:  acetaminophen   Tablet .. 650 milliGRAM(s) Oral every 6 hours PRN  cefTRIAXone   IVPB 1000 milliGRAM(s) IV Intermittent every 24 hours  enoxaparin Injectable 40 milliGRAM(s) SubCutaneous daily      SOCIAL HISTORY:  Smoker:  YES   ETOH use: no    FAMILY HISTORY:      VITALS:  Vital Signs Last 24 Hrs  T(C): 37.1 (23 Sep 2019 14:57), Max: 39 (23 Sep 2019 05:29)  T(F): 98.7 (23 Sep 2019 14:57), Max: 102.2 (23 Sep 2019 05:29)  HR: 80 (23 Sep 2019 14:57) (80 - 102)  BP: 105/60 (23 Sep 2019 14:57) (102/60 - 137/73)  BP(mean): --  RR: 18 (23 Sep 2019 14:57) (18 - 18)  SpO2: 99% (23 Sep 2019 14:57) (99% - 100%)    LABS/DIAGNOSTIC TESTS:                          11.9   13.18 )-----------( 126      ( 23 Sep 2019 06:27 )             34.9     WBC Count: 13.18 K/uL ( @ 06:27)  WBC Count: 13.91 K/uL ( @ 15:23)  WBC Count: 8.23 K/uL ( @ 18:45)          136  |  105  |  12  ----------------------------<  131<H>  3.3<L>   |  25  |  1.22    Ca    7.5<L>      23 Sep 2019 06:27  Phos  1.8       Mg     1.7         TPro  6.2  /  Alb  2.6<L>  /  TBili  1.6<H>  /  DBili  0.6<H>  /  AST  37  /  ALT  44  /  AlkPhos  80        Urinalysis Basic - ( 22 Sep 2019 18:53 )    Color: Yellow / Appearance: Clear / S.005 / pH: x  Gluc: x / Ketone: Negative  / Bili: Negative / Urobili: Negative   Blood: x / Protein: Negative / Nitrite: Negative   Leuk Esterase: Small / RBC: 10-25 /HPF / WBC 11-25 /HPF   Sq Epi: x / Non Sq Epi: Occasional /HPF / Bacteria: Trace /HPF        LIVER FUNCTIONS - ( 23 Sep 2019 06:27 )  Alb: 2.6 g/dL / Pro: 6.2 g/dL / ALK PHOS: 80 U/L / ALT: 44 U/L DA / AST: 37 U/L / GGT: x             PT/INR - ( 22 Sep 2019 15:23 )   PT: 16.2 sec;   INR: 1.44 ratio         PTT - ( 22 Sep 2019 15:23 )  PTT:34.4 sec    LACTATE:Lactate, Blood: 1.2 mmol/L ( @ 06:27)      ABG -     CULTURES:   .Blood   @ 22:16   Growth in aerobic and anaerobic bottles: Escherichia coli      .Blood   @ 22:15   **Please Note**: This is a Corrected Report**  Growth in aerobic and anaerobic bottles: Escherichia coli See previous  culture [id number]    Previously reported as:  Growth in aerobic bottle: Gram Positive Cocci in Clusters  --    **Please Note**: This is a Corrected Report**  Growth in aerobic bottle: Gram Negative Rods  Previously reported as:  Growth in aerobic bottle: Gram Positive Cocci in Clusters  Growth in anaerobic bottle: Gram Negative Rods      .Urine   @ 22:06   10,000 - 49,000 CFU/mL Gram Negative Rods  --  --          RADIOLOGY:< from: Xray Chest 1 View AP/PA (19 @ 18:51) >  EXAM:  XR CHEST AP OR PA 1V                            PROCEDURE DATE:  2019          INTERPRETATION:  PROCEDURE: AP view of the chest.    CLINICAL INFORMATION: Sepsis.    COMPARISON: None.    FINDINGS:    Lungs: The lungs are clear.  Heart: The heart is top normal in size.  Mediastinum: The mediastinum is within normal limits.    IMPRESSION:    Clear lungs.      < end of copied text >        ROS  [  ] UNABLE TO ELICIT

## 2019-09-23 NOTE — PROGRESS NOTE ADULT - SUBJECTIVE AND OBJECTIVE BOX
NP Note discussed with  Primary Attending    Patient is a 54y old  Male who presents with a chief complaint of bacteremia s/p prostate biopsy (23 Sep 2019 15:53)      INTERVAL HPI/OVERNIGHT EVENTS: no new complaints    MEDICATIONS  (STANDING):  cefTRIAXone   IVPB 1000 milliGRAM(s) IV Intermittent every 24 hours  enoxaparin Injectable 40 milliGRAM(s) SubCutaneous daily    MEDICATIONS  (PRN):  acetaminophen   Tablet .. 650 milliGRAM(s) Oral every 6 hours PRN Temp greater or equal to 38C (100.4F), Mild Pain (1 - 3), Moderate Pain (4 - 6)      __________________________________________________  REVIEW OF SYSTEMS:    CONSTITUTIONAL: No fever,   EYES: no acute visual disturbances  NECK: No pain or stiffness  RESPIRATORY: No cough; No shortness of breath  CARDIOVASCULAR: No chest pain, no palpitations  GASTROINTESTINAL: No pain. No nausea or vomiting; No diarrhea   NEUROLOGICAL: No headache or numbness, no tremors  MUSCULOSKELETAL: No joint pain, no muscle pain  GENITOURINARY: no dysuria, no frequency, no hesitancy  PSYCHIATRY: no depression , no anxiety  ALL OTHER  ROS negative        Vital Signs Last 24 Hrs  T(C): 37.1 (23 Sep 2019 14:57), Max: 39 (23 Sep 2019 05:29)  T(F): 98.7 (23 Sep 2019 14:57), Max: 102.2 (23 Sep 2019 05:29)  HR: 80 (23 Sep 2019 14:57) (80 - 102)  BP: 105/60 (23 Sep 2019 14:57) (102/60 - 137/73)  BP(mean): --  RR: 18 (23 Sep 2019 14:57) (18 - 18)  SpO2: 99% (23 Sep 2019 14:57) (99% - 100%)    ________________________________________________  PHYSICAL EXAM: well nourished, well groomed  GENERAL: NAD  HEENT: Normocephalic;  conjunctivae and sclerae clear; moist mucous membranes;   NECK : supple  CHEST/LUNG: Clear to auscultation bilaterally with good air entry   HEART: S1 S2  regular; no murmurs, gallops or rubs  ABDOMEN: Soft, Nontender, Nondistended; Bowel sounds present  EXTREMITIES: no cyanosis; no edema; no calf tenderness  SKIN: warm and dry; no rash  NERVOUS SYSTEM:  Awake and alert; Oriented  to place, person and time ; no new deficits    _________________________________________________  LABS:                        11.9   13.18 )-----------( 126      ( 23 Sep 2019 06:27 )             34.9         136  |  105  |  12  ----------------------------<  131<H>  3.3<L>   |  25  |  1.22    Ca    7.5<L>      23 Sep 2019 06:27  Phos  1.8       Mg     1.7         TPro  6.2  /  Alb  2.6<L>  /  TBili  1.6<H>  /  DBili  0.6<H>  /  AST  37  /  ALT  44  /  AlkPhos  80      PT/INR - ( 22 Sep 2019 15:23 )   PT: 16.2 sec;   INR: 1.44 ratio         PTT - ( 22 Sep 2019 15:23 )  PTT:34.4 sec  Urinalysis Basic - ( 22 Sep 2019 18:53 )    Color: Yellow / Appearance: Clear / S.005 / pH: x  Gluc: x / Ketone: Negative  / Bili: Negative / Urobili: Negative   Blood: x / Protein: Negative / Nitrite: Negative   Leuk Esterase: Small / RBC: 10-25 /HPF / WBC 11-25 /HPF   Sq Epi: x / Non Sq Epi: Occasional /HPF / Bacteria: Trace /HPF      CAPILLARY BLOOD GLUCOSE    RADIOLOGY & ADDITIONAL TESTS:      Xray Chest 1 View AP/PA (19 @ 18:51) >  EXAM:  XR CHEST AP OR PA 1V                            PROCEDURE DATE:  2019          INTERPRETATION:  PROCEDURE: AP view of the chest.    CLINICAL INFORMATION: Sepsis.    COMPARISON: None.    FINDINGS:    Lungs: The lungs are clear.  Heart: The heart is top normal in size.  Mediastinum: The mediastinum is within normal limits.    IMPRESSION:    Clear lungs.    < end of copied text >        Imaging Personally Reviewed:  YES/NO    Consultant(s) Notes Reviewed:   YES/ No    Care Discussed with Consultants :     Plan of care was discussed with patient and /or primary care giver; all questions and concerns were addressed and care was aligned with patient's wishes.

## 2019-09-23 NOTE — CONSULT NOTE ADULT - GASTROINTESTINAL DETAILS
nontender/no organomegaly/no distention/no rebound tenderness/no rigidity/no masses palpable/no guarding/bowel sounds normal/soft

## 2019-09-24 DIAGNOSIS — R73.03 PREDIABETES: ICD-10-CM

## 2019-09-24 DIAGNOSIS — E53.8 DEFICIENCY OF OTHER SPECIFIED B GROUP VITAMINS: ICD-10-CM

## 2019-09-24 DIAGNOSIS — Z71.89 OTHER SPECIFIED COUNSELING: ICD-10-CM

## 2019-09-24 LAB
-  AMIKACIN: SIGNIFICANT CHANGE UP
-  AMPICILLIN/SULBACTAM: SIGNIFICANT CHANGE UP
-  AMPICILLIN: SIGNIFICANT CHANGE UP
-  AZTREONAM: SIGNIFICANT CHANGE UP
-  CEFAZOLIN: SIGNIFICANT CHANGE UP
-  CEFEPIME: SIGNIFICANT CHANGE UP
-  CEFOXITIN: SIGNIFICANT CHANGE UP
-  CEFTRIAXONE: SIGNIFICANT CHANGE UP
-  CIPROFLOXACIN: SIGNIFICANT CHANGE UP
-  ERTAPENEM: SIGNIFICANT CHANGE UP
-  GENTAMICIN: SIGNIFICANT CHANGE UP
-  IMIPENEM: SIGNIFICANT CHANGE UP
-  LEVOFLOXACIN: SIGNIFICANT CHANGE UP
-  MEROPENEM: SIGNIFICANT CHANGE UP
-  PIPERACILLIN/TAZOBACTAM: SIGNIFICANT CHANGE UP
-  TOBRAMYCIN: SIGNIFICANT CHANGE UP
-  TRIMETHOPRIM/SULFAMETHOXAZOLE: SIGNIFICANT CHANGE UP
ANION GAP SERPL CALC-SCNC: 6 MMOL/L — SIGNIFICANT CHANGE UP (ref 5–17)
BUN SERPL-MCNC: 8 MG/DL — SIGNIFICANT CHANGE UP (ref 7–18)
CALCIUM SERPL-MCNC: 8.5 MG/DL — SIGNIFICANT CHANGE UP (ref 8.4–10.5)
CHLORIDE SERPL-SCNC: 106 MMOL/L — SIGNIFICANT CHANGE UP (ref 96–108)
CHOLEST SERPL-MCNC: 123 MG/DL — SIGNIFICANT CHANGE UP (ref 10–199)
CO2 SERPL-SCNC: 26 MMOL/L — SIGNIFICANT CHANGE UP (ref 22–31)
CREAT SERPL-MCNC: 1.05 MG/DL — SIGNIFICANT CHANGE UP (ref 0.5–1.3)
CULTURE RESULTS: SIGNIFICANT CHANGE UP
GLUCOSE SERPL-MCNC: 102 MG/DL — HIGH (ref 70–99)
HCT VFR BLD CALC: 38.2 % — LOW (ref 39–50)
HDLC SERPL-MCNC: 13 MG/DL — LOW
HGB BLD-MCNC: 12.8 G/DL — LOW (ref 13–17)
LIPID PNL WITH DIRECT LDL SERPL: 59 MG/DL — SIGNIFICANT CHANGE UP
MCHC RBC-ENTMCNC: 29.9 PG — SIGNIFICANT CHANGE UP (ref 27–34)
MCHC RBC-ENTMCNC: 33.5 GM/DL — SIGNIFICANT CHANGE UP (ref 32–36)
MCV RBC AUTO: 89.3 FL — SIGNIFICANT CHANGE UP (ref 80–100)
METHOD TYPE: SIGNIFICANT CHANGE UP
NRBC # BLD: 0 /100 WBCS — SIGNIFICANT CHANGE UP (ref 0–0)
ORGANISM # SPEC MICROSCOPIC CNT: SIGNIFICANT CHANGE UP
PLATELET # BLD AUTO: 142 K/UL — LOW (ref 150–400)
POTASSIUM SERPL-MCNC: 4 MMOL/L — SIGNIFICANT CHANGE UP (ref 3.5–5.3)
POTASSIUM SERPL-SCNC: 4 MMOL/L — SIGNIFICANT CHANGE UP (ref 3.5–5.3)
RBC # BLD: 4.28 M/UL — SIGNIFICANT CHANGE UP (ref 4.2–5.8)
RBC # FLD: 13.1 % — SIGNIFICANT CHANGE UP (ref 10.3–14.5)
SODIUM SERPL-SCNC: 138 MMOL/L — SIGNIFICANT CHANGE UP (ref 135–145)
SPECIMEN SOURCE: SIGNIFICANT CHANGE UP
TOTAL CHOLESTEROL/HDL RATIO MEASUREMENT: 9.5 RATIO — SIGNIFICANT CHANGE UP (ref 3.4–9.6)
TRIGL SERPL-MCNC: 254 MG/DL — HIGH (ref 10–149)
WBC # BLD: 11.42 K/UL — HIGH (ref 3.8–10.5)
WBC # FLD AUTO: 11.42 K/UL — HIGH (ref 3.8–10.5)

## 2019-09-24 RX ORDER — DOCOSANOL 100 MG/G
1 CREAM TOPICAL THREE TIMES A DAY
Refills: 0 | Status: DISCONTINUED | OUTPATIENT
Start: 2019-09-24 | End: 2019-09-26

## 2019-09-24 RX ORDER — ERGOCALCIFEROL 1.25 MG/1
50000 CAPSULE ORAL
Refills: 0 | Status: DISCONTINUED | OUTPATIENT
Start: 2019-09-24 | End: 2019-09-26

## 2019-09-24 RX ORDER — PREGABALIN 225 MG/1
1000 CAPSULE ORAL DAILY
Refills: 0 | Status: DISCONTINUED | OUTPATIENT
Start: 2019-09-24 | End: 2019-09-26

## 2019-09-24 RX ORDER — PREGABALIN 225 MG/1
30 CAPSULE ORAL DAILY
Refills: 0 | Status: DISCONTINUED | OUTPATIENT
Start: 2019-09-24 | End: 2019-09-24

## 2019-09-24 RX ORDER — MEROPENEM 1 G/30ML
1000 INJECTION INTRAVENOUS EVERY 8 HOURS
Refills: 0 | Status: DISCONTINUED | OUTPATIENT
Start: 2019-09-24 | End: 2019-09-26

## 2019-09-24 RX ADMIN — MEROPENEM 100 MILLIGRAM(S): 1 INJECTION INTRAVENOUS at 22:56

## 2019-09-24 RX ADMIN — MEROPENEM 100 MILLIGRAM(S): 1 INJECTION INTRAVENOUS at 13:45

## 2019-09-24 RX ADMIN — DOCOSANOL 1 APPLICATION(S): 100 CREAM TOPICAL at 22:56

## 2019-09-24 NOTE — CHART NOTE - NSCHARTNOTEFT_GEN_A_CORE
EVENT: patient c/o "cold sore" on his lip. States it's usually comes up when his immuno system is compromised.     OBJECTIVE:  Vital Signs Last 24 Hrs  T(C): 36.7 (24 Sep 2019 21:07), Max: 37.3 (24 Sep 2019 04:56)  T(F): 98 (24 Sep 2019 21:07), Max: 99.2 (24 Sep 2019 04:56)  HR: 71 (24 Sep 2019 21:07) (71 - 76)  BP: 126/82 (24 Sep 2019 21:07) (116/77 - 132/74)  BP(mean): --  RR: 16 (24 Sep 2019 21:07) (16 - 17)  SpO2: 99% (24 Sep 2019 21:07) (98% - 99%)    FOCUSED PHYSICAL EXAM:  Neuro: awake, alert, oriented x 3. No neuro deficit  Cardiovascular: Pulses +2 B/L in lower and upper extremities, HR regular, BP stable, No edema.  Respiratory: Respirations regular, unlabored, breath sounds clear B/L.   GI: Abdomen soft, non-tender, positive bowel sounds.  : no bladder distention noted. No complaints at this time.  Skin: Dry, intact, no bruising, no diaphoresis.    I&O's      LABS:                        12.8   11.42 )-----------( 142      ( 24 Sep 2019 06:24 )             38.2     09-24    138  |  106  |  8   ----------------------------<  102<H>  4.0   |  26  |  1.05    Ca    8.5      24 Sep 2019 06:24  Phos  1.8     09-23  Mg     1.7     09-23    TPro  6.2  /  Alb  2.6<L>  /  TBili  1.6<H>  /  DBili  0.6<H>  /  AST  37  /  ALT  44  /  AlkPhos  80  09-23        MICROBIOLOGY:        EKG:   IMGAGING:    ASSESSMENT:  HPI:  54 year old male presents to the ED with complaints of headache, fever, and body ache since yesterday. He reports that due to an abnormal MRI of prostrate (now placed on Cipro), he got a prostrate biopsy conducted this Thursday. Patient was recommended MRI of prostate by urologist because of elevated PSH level. Patient reports that blood culture were done and he felt better, thus he was discharged. However, today he notes that the fever has come back and that he has been feeling worse since yesterday and he feels upset stomach due to the antibiotics. Blood culture initial report came in yesterday as positive for gram negative rods. Patient is being admitted for IV antibiotics.  Denies abd pain, photophobia, neck pain, stiffness, or any other acute complaints. GINA. (22 Sep 2019 18:05)      PLAN:   1. Simon MARRUFO ordered    FOLLOW UP / RESULT: EVENT: patient c/o "cold sore" on his lip. States it's usually comes up when his immuno system is compromised.     OBJECTIVE:  Vital Signs Last 24 Hrs  T(C): 36.7 (24 Sep 2019 21:07), Max: 37.3 (24 Sep 2019 04:56)  T(F): 98 (24 Sep 2019 21:07), Max: 99.2 (24 Sep 2019 04:56)  HR: 71 (24 Sep 2019 21:07) (71 - 76)  BP: 126/82 (24 Sep 2019 21:07) (116/77 - 132/74)  BP(mean): --  RR: 16 (24 Sep 2019 21:07) (16 - 17)  SpO2: 99% (24 Sep 2019 21:07) (98% - 99%)    FOCUSED PHYSICAL EXAM:  Neuro: awake, alert, oriented x 3. No neuro deficit  Cardiovascular: Pulses +2 B/L in lower and upper extremities, HR regular, BP stable, No edema.  Respiratory: Respirations regular, unlabored, breath sounds clear B/L.   GI: Abdomen soft, non-tender, positive bowel sounds.  : no bladder distention noted. No complaints at this time.  Skin: Dry, intact, no bruising, no diaphoresis.    I&O's      LABS:                        12.8   11.42 )-----------( 142      ( 24 Sep 2019 06:24 )             38.2     09-24    138  |  106  |  8   ----------------------------<  102<H>  4.0   |  26  |  1.05    Ca    8.5      24 Sep 2019 06:24  Phos  1.8     09-23  Mg     1.7     09-23    TPro  6.2  /  Alb  2.6<L>  /  TBili  1.6<H>  /  DBili  0.6<H>  /  AST  37  /  ALT  44  /  AlkPhos  80  09-23        MICROBIOLOGY:        EKG:   IMGAGING:    ASSESSMENT:  HPI:  54 year old male presents to the ED with complaints of headache, fever, and body ache since yesterday. He reports that due to an abnormal MRI of prostrate (now placed on Cipro), he got a prostrate biopsy conducted this Thursday. Patient was recommended MRI of prostate by urologist because of elevated PSH level. Patient reports that blood culture were done and he felt better, thus he was discharged. However, today he notes that the fever has come back and that he has been feeling worse since yesterday and he feels upset stomach due to the antibiotics. Blood culture initial report came in yesterday as positive for gram negative rods. Patient is being admitted for IV antibiotics.  Denies abd pain, photophobia, neck pain, stiffness, or any other acute complaints. GINA. (22 Sep 2019 18:05)      PLAN:   1. Abreva TID ordered  2. airborn and contact precautions    FOLLOW UP / RESULT:

## 2019-09-24 NOTE — PROGRESS NOTE ADULT - SUBJECTIVE AND OBJECTIVE BOX
Patient is a 55 y/o Male who presents with a chief complaint of bacteremia s/p prostate biopsy.   admitted to medicine for management of bacteremia likely due to invasive procedure-prostate bx.     INTERVAL HPI/ OVERNIGHT EVENTS:  NAD, afebrile overnight, mild leukocytosis trending down      T(C): 36.7 (19 @ 13:02), Max: 37.3 (19 @ 04:56)  HR: 76 (19 @ 13:02) (72 - 78)  BP: 116/77 (19 @ 13:02) (116/77 - 141/75)  RR: 17 (19 @ 13:02) (16 - 18)  SpO2: 98% (19 @ 13:02) (98% - 100%)  Wt(kg): --  I&O's Summary      PAST MEDICAL & SURGICAL HISTORY:  No pertinent past medical history      LABS:                        12.8   11.42 )-----------( 142      ( 24 Sep 2019 06:24 )             38.2         138  |  106  |  8   ----------------------------<  102<H>  4.0   |  26  |  1.05    Ca    8.5      24 Sep 2019 06:24  Phos  1.8       Mg     1.7         TPro  6.2  /  Alb  2.6<L>  /  TBili  1.6<H>  /  DBili  0.6<H>  /  AST  37  /  ALT  44  /  AlkPhos  80        Urinalysis Basic - ( 22 Sep 2019 18:53 )    Color: Yellow / Appearance: Clear / S.005 / pH: x  Gluc: x / Ketone: Negative  / Bili: Negative / Urobili: Negative   Blood: x / Protein: Negative / Nitrite: Negative   Leuk Esterase: Small / RBC: 10-25 /HPF / WBC 11-25 /HPF   Sq Epi: x / Non Sq Epi: Occasional /HPF / Bacteria: Trace /HPF      CAPILLARY BLOOD GLUCOSE            Urinalysis Basic - ( 22 Sep 2019 18:53 )    Color: Yellow / Appearance: Clear / S.005 / pH: x  Gluc: x / Ketone: Negative  / Bili: Negative / Urobili: Negative   Blood: x / Protein: Negative / Nitrite: Negative   Leuk Esterase: Small / RBC: 10-25 /HPF / WBC 11-25 /HPF   Sq Epi: x / Non Sq Epi: Occasional /HPF / Bacteria: Trace /HPF        MEDICATIONS  (STANDING):  enoxaparin Injectable 40 milliGRAM(s) SubCutaneous daily  meropenem  IVPB 1000 milliGRAM(s) IV Intermittent every 8 hours    MEDICATIONS  (PRN):  acetaminophen   Tablet .. 650 milliGRAM(s) Oral every 6 hours PRN Temp greater or equal to 38C (100.4F), Mild Pain (1 - 3), Moderate Pain (4 - 6)      REVIEW OF SYSTEMS:  CONSTITUTIONAL: No fever, weight loss, or fatigue  EYES: No eye pain, visual disturbances, or discharge  ENMT:  No difficulty hearing, tinnitus, vertigo; No sinus or throat pain  NECK: No pain or stiffness  RESPIRATORY: No cough, wheezing, chills or hemoptysis; No shortness of breath  CARDIOVASCULAR: No chest pain, palpitations, dizziness, or leg swelling  GASTROINTESTINAL: No abdominal or epigastric pain. No nausea, vomiting, or hematemesis; No diarrhea or constipation. No melena or hematochezia.  GENITOURINARY: No dysuria, frequency, hematuria, or incontinence  NEUROLOGICAL: No headaches, memory loss, loss of strength, numbness, or tremors  SKIN: No itching, burning, rashes, or lesions   LYMPH NODES: No enlarged glands  ENDOCRINE: No heat or cold intolerance; No hair loss  MUSCULOSKELETAL: No joint pain or swelling; No muscle, back, or extremity pain  PSYCHIATRIC: No depression, anxiety, mood swings, or difficulty sleeping  ALLERGY AND IMMUNOLOGIC: No hives or eczema    RADIOLOGY & ADDITIONAL TESTS:  < from: Xray Chest 1 View AP/PA (19 @ 18:51) >  EXAM:  XR CHEST AP OR PA 1V                            PROCEDURE DATE:  2019          INTERPRETATION:  PROCEDURE: AP view of the chest.    CLINICAL INFORMATION: Sepsis.    COMPARISON: None.    FINDINGS:    Lungs: The lungs are clear.  Heart: The heart is top normal in size.  Mediastinum: The mediastinum is within normal limits.    IMPRESSION:    Clear lungs.              < end of copied text >    Imaging Personally Reviewed:  [x ] YES  [ ] NO    Consultant(s) Notes Reviewed:  [ ] YES  [ ] NO    PHYSICAL EXAM:  GENERAL: NAD, well-groomed, well-developed  HEAD:  Atraumatic, Normocephalic  EYES: EOMI, PERRLA, conjunctiva and sclera clear  ENMT: No tonsillar erythema, exudates, or enlargement; Moist mucous membranes, Good dentition, No lesions  NECK: Supple, No JVD, Normal thyroid  NERVOUS SYSTEM:  Alert & Oriented X3, Good concentration; Motor Strength 5/5 B/L upper and lower extremities; DTRs 2+ intact and symmetric  CHEST/LUNG: Clear to percussion bilaterally; No rales, rhonchi, wheezing, or rubs  HEART: Regular rate and rhythm; No murmurs, rubs, or gallops  ABDOMEN: Soft, Nontender, Nondistended; Bowel sounds present  EXTREMITIES:  2+ Peripheral Pulses, No clubbing, cyanosis, or edema  LYMPH: No lymphadenopathy noted  SKIN: No rashes or lesions    Care Collaborated Discussed with Consultants/Other Providers [ ] YES  [ ] NO Patient is a 55 y/o Male who presents with a chief complaint of bacteremia s/p prostate biopsy.   admitted to medicine for management of bacteremia likely due to invasive procedure-prostate bx.     INTERVAL HPI/ OVERNIGHT EVENTS:  NAD, afebrile overnight, mild leukocytosis trending down      T(C): 36.7 (19 @ 13:02), Max: 37.3 (19 @ 04:56)  HR: 76 (19 @ 13:02) (72 - 78)  BP: 116/77 (19 @ 13:02) (116/77 - 141/75)  RR: 17 (19 @ 13:02) (16 - 18)  SpO2: 98% (19 @ 13:02) (98% - 100%)  Wt(kg): --  I&O's Summary      PAST MEDICAL & SURGICAL HISTORY:  No pertinent past medical history      LABS:                        12.8   11.42 )-----------( 142      ( 24 Sep 2019 06:24 )             38.2         138  |  106  |  8   ----------------------------<  102<H>  4.0   |  26  |  1.05    Ca    8.5      24 Sep 2019 06:24  Phos  1.8       Mg     1.7         TPro  6.2  /  Alb  2.6<L>  /  TBili  1.6<H>  /  DBili  0.6<H>  /  AST  37  /  ALT  44  /  AlkPhos  80        Urinalysis Basic - ( 22 Sep 2019 18:53 )    Color: Yellow / Appearance: Clear / S.005 / pH: x  Gluc: x / Ketone: Negative  / Bili: Negative / Urobili: Negative   Blood: x / Protein: Negative / Nitrite: Negative   Leuk Esterase: Small / RBC: 10-25 /HPF / WBC 11-25 /HPF   Sq Epi: x / Non Sq Epi: Occasional /HPF / Bacteria: Trace /HPF      CAPILLARY BLOOD GLUCOSE            Urinalysis Basic - ( 22 Sep 2019 18:53 )    Color: Yellow / Appearance: Clear / S.005 / pH: x  Gluc: x / Ketone: Negative  / Bili: Negative / Urobili: Negative   Blood: x / Protein: Negative / Nitrite: Negative   Leuk Esterase: Small / RBC: 10-25 /HPF / WBC 11-25 /HPF   Sq Epi: x / Non Sq Epi: Occasional /HPF / Bacteria: Trace /HPF        MEDICATIONS  (STANDING):  enoxaparin Injectable 40 milliGRAM(s) SubCutaneous daily  meropenem  IVPB 1000 milliGRAM(s) IV Intermittent every 8 hours    MEDICATIONS  (PRN):  acetaminophen   Tablet .. 650 milliGRAM(s) Oral every 6 hours PRN Temp greater or equal to 38C (100.4F), Mild Pain (1 - 3), Moderate Pain (4 - 6)      REVIEW OF SYSTEMS:  CONSTITUTIONAL: No fever, weight loss, or fatigue  EYES: No eye pain, visual disturbances, or discharge  ENMT:  No difficulty hearing, tinnitus, vertigo; No sinus or throat pain  NECK: No pain or stiffness  RESPIRATORY: No cough, wheezing, chills or hemoptysis; No shortness of breath  CARDIOVASCULAR: No chest pain, palpitations, dizziness, or leg swelling  GASTROINTESTINAL: No abdominal or epigastric pain. No nausea, vomiting, or hematemesis; No diarrhea or constipation. No melena or hematochezia.  GENITOURINARY: No dysuria, frequency, hematuria, or incontinence  NEUROLOGICAL: No headaches, memory loss, loss of strength, numbness, or tremors  SKIN: No itching, burning, rashes, or lesions   LYMPH NODES: No enlarged glands  ENDOCRINE: No heat or cold intolerance; No hair loss  MUSCULOSKELETAL: No joint pain or swelling; No muscle, back, or extremity pain  PSYCHIATRIC: No depression, anxiety, mood swings, or difficulty sleeping  ALLERGY AND IMMUNOLOGIC: No hives or eczema    RADIOLOGY & ADDITIONAL TESTS:  < from: Xray Chest 1 View AP/PA (19 @ 18:51) >  EXAM:  XR CHEST AP OR PA 1V                            PROCEDURE DATE:  2019          INTERPRETATION:  PROCEDURE: AP view of the chest.    CLINICAL INFORMATION: Sepsis.    COMPARISON: None.    FINDINGS:    Lungs: The lungs are clear.  Heart: The heart is top normal in size.  Mediastinum: The mediastinum is within normal limits.    IMPRESSION:    Clear lungs.              < end of copied text >    Imaging Personally Reviewed:  [x ] YES  [ ] NO    Consultant(s) Notes Reviewed:  [x ] YES  [ ] NO    PHYSICAL EXAM:  GENERAL: NAD, well-groomed, well-developed  HEAD:  Atraumatic, Normocephalic  EYES: EOMI, PERRLA, conjunctiva and sclera clear  ENMT: No tonsillar erythema, exudates, or enlargement; Moist mucous membranes, Good dentition, No lesions  NECK: Supple, No JVD, Normal thyroid  NERVOUS SYSTEM:  Alert & Oriented X3, Good concentration; Motor Strength 5/5 B/L upper and lower extremities; DTRs 2+ intact and symmetric  CHEST/LUNG: Clear to percussion bilaterally; No rales, rhonchi, wheezing, or rubs  HEART: Regular rate and rhythm; No murmurs, rubs, or gallops  ABDOMEN: Soft, Nontender, Nondistended; Bowel sounds present  EXTREMITIES:  2+ Peripheral Pulses, No clubbing, cyanosis, or edema  LYMPH: No lymphadenopathy noted  SKIN: No rashes or lesions    Care Collaborated Discussed with Consultants/Other Providers [ ] YES  [ ] NO Patient is a 55 y/o Male who presents with a chief complaint of bacteremia s/p prostate biopsy.   admitted to medicine for management of bacteremia likely due to invasive procedure-prostate bx.     INTERVAL HPI/ OVERNIGHT EVENTS:  NAD, afebrile overnight, mild leukocytosis trending down      T(C): 36.7 (19 @ 13:02), Max: 37.3 (19 @ 04:56)  HR: 76 (19 @ 13:02) (72 - 78)  BP: 116/77 (19 @ 13:02) (116/77 - 141/75)  RR: 17 (19 @ 13:02) (16 - 18)  SpO2: 98% (19 @ 13:02) (98% - 100%)  Wt(kg): --  I&O's Summary      PAST MEDICAL & SURGICAL HISTORY:  No pertinent past medical history      LABS:                        12.8   11.42 )-----------( 142      ( 24 Sep 2019 06:24 )             38.2         138  |  106  |  8   ----------------------------<  102<H>  4.0   |  26  |  1.05    Ca    8.5      24 Sep 2019 06:24  Phos  1.8       Mg     1.7         TPro  6.2  /  Alb  2.6<L>  /  TBili  1.6<H>  /  DBili  0.6<H>  /  AST  37  /  ALT  44  /  AlkPhos  80        Urinalysis Basic - ( 22 Sep 2019 18:53 )    Color: Yellow / Appearance: Clear / S.005 / pH: x  Gluc: x / Ketone: Negative  / Bili: Negative / Urobili: Negative   Blood: x / Protein: Negative / Nitrite: Negative   Leuk Esterase: Small / RBC: 10-25 /HPF / WBC 11-25 /HPF   Sq Epi: x / Non Sq Epi: Occasional /HPF / Bacteria: Trace /HPF      CAPILLARY BLOOD GLUCOSE            Urinalysis Basic - ( 22 Sep 2019 18:53 )    Color: Yellow / Appearance: Clear / S.005 / pH: x  Gluc: x / Ketone: Negative  / Bili: Negative / Urobili: Negative   Blood: x / Protein: Negative / Nitrite: Negative   Leuk Esterase: Small / RBC: 10-25 /HPF / WBC 11-25 /HPF   Sq Epi: x / Non Sq Epi: Occasional /HPF / Bacteria: Trace /HPF        MEDICATIONS  (STANDING):  enoxaparin Injectable 40 milliGRAM(s) SubCutaneous daily  meropenem  IVPB 1000 milliGRAM(s) IV Intermittent every 8 hours    MEDICATIONS  (PRN):  acetaminophen   Tablet .. 650 milliGRAM(s) Oral every 6 hours PRN Temp greater or equal to 38C (100.4F), Mild Pain (1 - 3), Moderate Pain (4 - 6)      REVIEW OF SYSTEMS:  CONSTITUTIONAL: No fever, weight loss, or fatigue  EYES: No eye pain, visual disturbances, or discharge  ENMT:  No difficulty hearing, tinnitus, vertigo; No sinus or throat pain  NECK: No pain or stiffness  RESPIRATORY: No cough, wheezing, chills or hemoptysis; No shortness of breath  CARDIOVASCULAR: No chest pain, palpitations, dizziness, or leg swelling  GASTROINTESTINAL: No abdominal or epigastric pain. No nausea, vomiting, or hematemesis; No diarrhea or constipation. No melena or hematochezia.  GENITOURINARY: No dysuria, frequency, hematuria, or incontinence  NEUROLOGICAL: No headaches, memory loss, loss of strength, numbness, or tremors  SKIN: No itching, burning, rashes, or lesions   LYMPH NODES: No enlarged glands  ENDOCRINE: No heat or cold intolerance; No hair loss  MUSCULOSKELETAL: No joint pain or swelling; No muscle, back, or extremity pain  PSYCHIATRIC: No depression, anxiety, mood swings, or difficulty sleeping  ALLERGY AND IMMUNOLOGIC: No hives or eczema    RADIOLOGY & ADDITIONAL TESTS:  < from: Xray Chest 1 View AP/PA (19 @ 18:51) >  EXAM:  XR CHEST AP OR PA 1V                            PROCEDURE DATE:  2019          INTERPRETATION:  PROCEDURE: AP view of the chest.    CLINICAL INFORMATION: Sepsis.    COMPARISON: None.    FINDINGS:    Lungs: The lungs are clear.  Heart: The heart is top normal in size.  Mediastinum: The mediastinum is within normal limits.    IMPRESSION:    Clear lungs.              < end of copied text >    Imaging Personally Reviewed:  [x ] YES  [ ] NO    Consultant(s) Notes Reviewed:  [x ] YES  [ ] NO    PHYSICAL EXAM:  GENERAL: NAD, well-groomed, well-developed  HEAD:  Atraumatic, Normocephalic  EYES: conjunctiva and sclera clear  ENMT: No tonsillar erythema, exudates, or enlargement; Moist mucous membranes, Good dentition, No lesions  NECK: Supple, No JVD, Normal thyroid  NERVOUS SYSTEM:  Alert & Oriented X3, Good concentration  CHEST/LUNG: Clear to percussion bilaterally; No rales, rhonchi, wheezing, or rubs  HEART: Regular rate and rhythm; No murmurs, rubs, or gallops  ABDOMEN: Soft, Nontender, Nondistended; Bowel sounds present  EXTREMITIES:  2+ Peripheral Pulses, No clubbing, cyanosis, or edema  LYMPH: No lymphadenopathy noted  SKIN: No rashes or lesions    Care Collaborated Discussed with Consultants/Other Providers [x ] YES  [ ] NO

## 2019-09-24 NOTE — PROGRESS NOTE ADULT - PROBLEM SELECTOR PLAN 1
E. Coli Bacteremia likely due to recent prostate Bx  leukocytosis trending down  Cont Rocephin  ID following Dr. Luo   9/24 am repeat blood culture E. Coli Bacteremia likely due to recent prostate Bx  leukocytosis trending down  c/w meropnem  ID following Dr. Luo   9/24 am repeat blood culture

## 2019-09-25 DIAGNOSIS — Z02.9 ENCOUNTER FOR ADMINISTRATIVE EXAMINATIONS, UNSPECIFIED: ICD-10-CM

## 2019-09-25 LAB
ANION GAP SERPL CALC-SCNC: 4 MMOL/L — LOW (ref 5–17)
BUN SERPL-MCNC: 9 MG/DL — SIGNIFICANT CHANGE UP (ref 7–18)
CALCIUM SERPL-MCNC: 9 MG/DL — SIGNIFICANT CHANGE UP (ref 8.4–10.5)
CHLORIDE SERPL-SCNC: 104 MMOL/L — SIGNIFICANT CHANGE UP (ref 96–108)
CO2 SERPL-SCNC: 30 MMOL/L — SIGNIFICANT CHANGE UP (ref 22–31)
CREAT SERPL-MCNC: 0.99 MG/DL — SIGNIFICANT CHANGE UP (ref 0.5–1.3)
GLUCOSE SERPL-MCNC: 100 MG/DL — HIGH (ref 70–99)
HCT VFR BLD CALC: 40.8 % — SIGNIFICANT CHANGE UP (ref 39–50)
HGB BLD-MCNC: 13.7 G/DL — SIGNIFICANT CHANGE UP (ref 13–17)
MCHC RBC-ENTMCNC: 29.7 PG — SIGNIFICANT CHANGE UP (ref 27–34)
MCHC RBC-ENTMCNC: 33.6 GM/DL — SIGNIFICANT CHANGE UP (ref 32–36)
MCV RBC AUTO: 88.5 FL — SIGNIFICANT CHANGE UP (ref 80–100)
NRBC # BLD: 0 /100 WBCS — SIGNIFICANT CHANGE UP (ref 0–0)
PLATELET # BLD AUTO: 178 K/UL — SIGNIFICANT CHANGE UP (ref 150–400)
POTASSIUM SERPL-MCNC: 4.2 MMOL/L — SIGNIFICANT CHANGE UP (ref 3.5–5.3)
POTASSIUM SERPL-SCNC: 4.2 MMOL/L — SIGNIFICANT CHANGE UP (ref 3.5–5.3)
RBC # BLD: 4.61 M/UL — SIGNIFICANT CHANGE UP (ref 4.2–5.8)
RBC # FLD: 13.2 % — SIGNIFICANT CHANGE UP (ref 10.3–14.5)
SODIUM SERPL-SCNC: 138 MMOL/L — SIGNIFICANT CHANGE UP (ref 135–145)
WBC # BLD: 8.87 K/UL — SIGNIFICANT CHANGE UP (ref 3.8–10.5)
WBC # FLD AUTO: 8.87 K/UL — SIGNIFICANT CHANGE UP (ref 3.8–10.5)

## 2019-09-25 RX ORDER — GENTAMICIN SULFATE 40 MG/ML
200 VIAL (ML) INJECTION ONCE
Refills: 0 | Status: COMPLETED | OUTPATIENT
Start: 2019-09-25 | End: 2019-09-25

## 2019-09-25 RX ORDER — MOXIFLOXACIN HYDROCHLORIDE TABLETS, 400 MG 400 MG/1
1 TABLET, FILM COATED ORAL
Qty: 0 | Refills: 0 | DISCHARGE

## 2019-09-25 RX ORDER — DOCOSANOL 100 MG/G
1 CREAM TOPICAL
Qty: 0 | Refills: 0 | DISCHARGE
Start: 2019-09-25

## 2019-09-25 RX ORDER — AZTREONAM 2 G
1 VIAL (EA) INJECTION
Qty: 50 | Refills: 0
Start: 2019-09-25 | End: 2019-10-19

## 2019-09-25 RX ADMIN — MEROPENEM 100 MILLIGRAM(S): 1 INJECTION INTRAVENOUS at 22:01

## 2019-09-25 RX ADMIN — PREGABALIN 1000 MICROGRAM(S): 225 CAPSULE ORAL at 11:54

## 2019-09-25 RX ADMIN — ERGOCALCIFEROL 50000 UNIT(S): 1.25 CAPSULE ORAL at 11:54

## 2019-09-25 RX ADMIN — MEROPENEM 100 MILLIGRAM(S): 1 INJECTION INTRAVENOUS at 14:32

## 2019-09-25 RX ADMIN — DOCOSANOL 1 APPLICATION(S): 100 CREAM TOPICAL at 14:33

## 2019-09-25 RX ADMIN — DOCOSANOL 1 APPLICATION(S): 100 CREAM TOPICAL at 06:06

## 2019-09-25 RX ADMIN — DOCOSANOL 1 APPLICATION(S): 100 CREAM TOPICAL at 22:03

## 2019-09-25 RX ADMIN — MEROPENEM 100 MILLIGRAM(S): 1 INJECTION INTRAVENOUS at 07:53

## 2019-09-25 RX ADMIN — Medication 210 MILLIGRAM(S): at 17:56

## 2019-09-25 NOTE — PROGRESS NOTE ADULT - SUBJECTIVE AND OBJECTIVE BOX
54y Male    Meds:  meropenem  IVPB 1000 milliGRAM(s) IV Intermittent every 8 hours    Allergies    No Known Allergies    Intolerances        VITALS:  Vital Signs Last 24 Hrs  T(C): 36.8 (25 Sep 2019 13:56), Max: 37.1 (25 Sep 2019 04:34)  T(F): 98.2 (25 Sep 2019 13:56), Max: 98.8 (25 Sep 2019 04:34)  HR: 74 (25 Sep 2019 13:56) (71 - 74)  BP: 114/77 (25 Sep 2019 13:56) (114/77 - 126/82)  BP(mean): --  RR: 16 (25 Sep 2019 13:56) (16 - 17)  SpO2: 98% (25 Sep 2019 13:56) (98% - 99%)    LABS/DIAGNOSTIC TESTS:                          13.7   8.87  )-----------( 178      ( 25 Sep 2019 07:01 )             40.8         09-25    138  |  104  |  9   ----------------------------<  100<H>  4.2   |  30  |  0.99    Ca    9.0      25 Sep 2019 07:01            CULTURES: .Blood  09-24 @ 10:08   No growth to date.  --  --      .Urine  09-23 @ 10:50   <10,000 CFU/mL Normal Urogenital Nargis  --  --      .Blood  09-22 @ 22:22   No growth to date.  --  --      .Blood  09-22 @ 22:18   No growth to date.  --  --      .Blood  09-21 @ 22:16   Growth in aerobic and anaerobic bottles: Escherichia coli ESBL      .Blood  09-21 @ 22:15   **Please Note**: This is a Corrected Report**  Growth in aerobic and anaerobic bottles: Escherichia coli ESBL See  previous culture [id number]    Previously reported as:  Growth in aerobic bottle: Gram Positive Cocci in Clusters  --    **Please Note**: This is a Corrected Report**  Growth in aerobic bottle: Gram Negative Rods  Previously reported as:  Growth in aerobic bottle: Gram Positive Cocci in Clusters  Growth in anaerobic bottle: Gram Negative Rods      .Urine  09-21 @ 22:06   10,000 - 49,000 CFU/mL Escherichia coli ESBL  --  Escherichia coli ESBL            RADIOLOGY:      ROS:  [  ] UNABLE TO ELICIT 54y Male who is doing great, he is totally asymptomatic at this time, he has no fevers , chills, urinary symptoms or diarrhea. His repeat blood cultures are negative so far.    Meds:  meropenem  IVPB 1000 milliGRAM(s) IV Intermittent every 8 hours    Allergies    No Known Allergies    Intolerances        VITALS:  Vital Signs Last 24 Hrs  T(C): 36.8 (25 Sep 2019 13:56), Max: 37.1 (25 Sep 2019 04:34)  T(F): 98.2 (25 Sep 2019 13:56), Max: 98.8 (25 Sep 2019 04:34)  HR: 74 (25 Sep 2019 13:56) (71 - 74)  BP: 114/77 (25 Sep 2019 13:56) (114/77 - 126/82)  BP(mean): --  RR: 16 (25 Sep 2019 13:56) (16 - 17)  SpO2: 98% (25 Sep 2019 13:56) (98% - 99%)    LABS/DIAGNOSTIC TESTS:                          13.7   8.87  )-----------( 178      ( 25 Sep 2019 07:01 )             40.8         09-25    138  |  104  |  9   ----------------------------<  100<H>  4.2   |  30  |  0.99    Ca    9.0      25 Sep 2019 07:01            CULTURES: .Blood  09-24 @ 10:08   No growth to date.  --  --      .Urine  09-23 @ 10:50   <10,000 CFU/mL Normal Urogenital Nargis  --  --      .Blood  09-22 @ 22:22   No growth to date.  --  --      .Blood  09-22 @ 22:18   No growth to date.  --  --      .Blood  09-21 @ 22:16   Growth in aerobic and anaerobic bottles: Escherichia coli ESBL      .Blood  09-21 @ 22:15   **Please Note**: This is a Corrected Report**  Growth in aerobic and anaerobic bottles: Escherichia coli ESBL See  previous culture [id number]    Previously reported as:  Growth in aerobic bottle: Gram Positive Cocci in Clusters  --    **Please Note**: This is a Corrected Report**  Growth in aerobic bottle: Gram Negative Rods  Previously reported as:  Growth in aerobic bottle: Gram Positive Cocci in Clusters  Growth in anaerobic bottle: Gram Negative Rods      .Urine  09-21 @ 22:06   10,000 - 49,000 CFU/mL Escherichia coli ESBL  --  Escherichia coli ESBL            RADIOLOGY:      ROS:  [  ] UNABLE TO ELICIT

## 2019-09-25 NOTE — PROGRESS NOTE ADULT - GASTROINTESTINAL DETAILS
no rigidity/no guarding/no organomegaly/no rebound tenderness/nontender/soft/no distention/bowel sounds normal

## 2019-09-25 NOTE — DISCHARGE NOTE PROVIDER - CARE PROVIDER_API CALL
Jordon Worley  [phone] [place] [address]  Phone: [phone]  Fax: (   )    -  Follow Up Time: 2 weeks    Jamal Mack)  Urology  [address] [place] [address]  Phone: [phone]  Fax: [phone]  Follow Up Time: 1 week

## 2019-09-25 NOTE — DISCHARGE NOTE PROVIDER - PROVIDER TOKENS
FREE:[LAST:[Cantil],FIRST:[Jordon],PHONE:[phone]],FAX:[phone]   [phone]    -],ADDRESS:[[address] [place] [address]],FOLLOWUP:[2 weeks]],PROVIDER:[TOKEN:[[id number]],FOLLOWUP:[1 week]]

## 2019-09-25 NOTE — DISCHARGE NOTE PROVIDER - HOSPITAL COURSE
55 y/o male presents to the ED with complaints of headache, fever, and body ache since yesterday. He reports that due to an abnormal MRI of prostrate (now placed on Cipro), he got a prostrate biopsy conducted 09/19/19. Patient reports that blood culture was done and he felt better, thus he was discharged. However, he noted that the fever his came back and that he has been feeling worse since 09/21/19 with upset stomach due to the antibiotics. Blood culture report was positive for gram negative rods. Patient was  admitted for bacteremia with gram negative rods in 4/4 bottles likely E coli. Urine cultures are also positive for gram negative rods.    Patient was given 1  dose of UNasyn in ED started on IV ceftriaxone to cover for gram negative rods. Pt also given 1 dose of vancomycin for gram positive cocci in clusters in 1/4 bottles. Urine culture (+) for E. coli ESBL. Antibiotics changed to meropenem. Pt's condition has improved and will transition to oral antibiotics. Pt is now medically cleared for discharge to home 53 y/o male presents to the ED with complaints of headache, fever, and body ache. He reports that due to an abnormal MRI of prostrate (now placed on Cipro), he got a prostrate biopsy conducted 09/19/19. Patient reports that blood culture was done and he felt better, thus he was discharged. However, he noted that the fever his came back and that he has been feeling worse since 09/21/19 with upset stomach due to the antibiotics. Blood culture report was positive for gram negative rods. Patient was  admitted for bacteremia with gram negative rods in 4/4 bottles likely E coli. Urine cultures are also positive for gram negative rods.    Patient was given 1  dose of UNasyn in ED. ID Dr. Luo consulted, pt started on IV ceftriaxone to cover for gram negative rods. Pt also given 1 dose of vancomycin for gram positive cocci in clusters in 1/4 bottles. Urine culture (+) for E. coli ESBL. Antibiotics changed to meropenem. Pt's condition has improved and will transition to oral antibiotics. Pt is now medically cleared for discharge to home 53 y/o male presents to the ED with complaints of headache, fever, and body ache. He reports that due to an abnormal MRI of prostrate (now placed on Cipro), he got a prostrate biopsy conducted 09/19/19. Patient reports that blood culture was done and he felt better, thus he was discharged. However, he noted that the fever his came back and that he has been feeling worse since 09/21/19 with upset stomach due to the antibiotics. Blood culture report was positive for gram negative rods. Patient was  admitted for bacteremia with gram negative rods in 4/4 bottles likely E coli. Urine cultures are also positive for gram negative rods.    Patient was given 1  dose of UNasyn in ED. ID specialist consulted, pt started on IV ceftriaxone to cover for gram negative rods. Pt also given 1 dose of vancomycin for gram positive cocci in clusters in 1/4 bottles. Urine culture (+) for E. coli ESBL. Antibiotics changed to meropenem. Pt's condition has improved and will transition to oral antibiotics. Pt is now medically cleared for discharge to home

## 2019-09-25 NOTE — PROGRESS NOTE ADULT - PROBLEM SELECTOR PLAN 6
IMPROVE VTE Individual Risk Assessment  RISK                                                          Points  [] Previous VTE                                           3  [] Thrombophilia                                        2  [] Lower limb paralysis                              2   [] Current Cancer                                       2   [] Immobilization > 24 hrs                        1  [] ICU/CCU stay > 24 hours                       1  [] Age > 60                                                   1  IMPROVE VTE Score = 1  lovenox 40 mg
Patient does not have advance directive  In full code.
DVT PPx- c/w lovenox

## 2019-09-25 NOTE — PROGRESS NOTE ADULT - PROBLEM SELECTOR PLAN 4
-No flare up at this time.  -Uric Acid 4.6
pt has history of gout for which he takes allopurinol at home.  will continue.  f.u uric acid level in am
B12 level-<150  c/w  on vitamin b12  mcg x 3 days.
B12 level-<150  Will start on vitamin b12 IM 30 mcg x 5 days

## 2019-09-25 NOTE — PROGRESS NOTE ADULT - ASSESSMENT
54 year old male a/w bacteremia following prostate biopsy
54 year old male presents to the ED with complaints of headache, fever, and body ache since yesterday. He reports that due to an abnormal MRI of prostrate (now placed on Cipro), he got a prostrate biopsy conducted this Thursday. Patient reports that blood culture was done and he felt better, thus he was discharged. However, today he notes that the fever has come back and that he has been feeling worse since yesterday and he feels upset stomach due to the antibiotics. Blood culture initaial report came in yesterday as positive for gram negative rods. Patient is being admitted for IV antibiotics.   Denies abd pain, photophobia, neck pain, stiffness, or any other acute complaints. NKDA.    9/23-Pt. admitted to medicine for management of bacteremia likely due to invasive procedure-prostate bx.  Pt. seen and examined, noted in NAD, T-max early am 102.2 gradually coming down with mild leukocytosis.  Pt. is on Rocephin, ID Dr. Luo consulted, will follow recommendations.
Problem/Plan - 3:  ·  Problem: Hyperlipidemia.  Plan: T-Chol 88, LDL 47, HDL 14, Trig 135  -Will repeat lipid panel in am as HDL and LDL levels are too low  -pt. reports no engagement in physical activity or exercise due to work schedule.     Problem/Plan - 4:  ·  Problem: Gout.  Plan: -No flare up at this time.  -Uric Acid 4.6.     Problem/Plan - 5:  ·  Problem: HDL deficiency.  Plan: -Will repeat in am  -Life style modifications  -Consider statin.     Problem/Plan - 6:  Problem: Prophylactic measure. Plan: IMPROVE VTE Individual Risk Assessment
Acute Prostatitis - post biopsy  Bacteremia    Plan - cont meropenem 1 gm iv q8hrs till tomorrow afternoon's dose  Will give Gentamicin 200mgs iv x1 dose today for synergy   can be discharged home tomorrow on Bactrim DS 1 tab po BID x 25 days more.  Reconsult prn.

## 2019-09-25 NOTE — PROGRESS NOTE ADULT - PROBLEM SELECTOR PLAN 2
-Pt. with elevated PSA, underwent MRI after which he underwent prostate Bx at [facility] on 9/19/19.  -Bx results will be available 9/26  -Pt. denies difficulty voiding at this time
Pt. with elevated PSA, underwent MRI after which he underwent prostate Bx at [facility] on 9/19/19.  Bx results will be available 9/26
pt reports that due to an abnormal MRI of prostrate (now placed on Cipro), he got a prostrate biopsy conducted this Thursday.   Patient was recommended MRI of prostate by urologist because of elevated PSH level.  Patient is currenlty experiencing no urinary symptoms.  Biopsy of prostate results will come on thursday 09/26/2019
Pt. with elevated PSA, underwent MRI after which he underwent prostate Bx at [facility] on 9/19/19.  Bx results will be available 9/26.

## 2019-09-25 NOTE — DISCHARGE NOTE PROVIDER - NSDCCPCAREPLAN_GEN_ALL_CORE_FT
PRINCIPAL DISCHARGE DIAGNOSIS  Diagnosis: Bacteremia  Assessment and Plan of Treatment: Bacteremia      SECONDARY DISCHARGE DIAGNOSES  Diagnosis: Prostate disease  Assessment and Plan of Treatment: Prostate disease    Diagnosis: Prediabetes  Assessment and Plan of Treatment: Prediabetes    Diagnosis: Hyperlipidemia  Assessment and Plan of Treatment: Hyperlipidemia    Diagnosis: Prophylactic measure  Assessment and Plan of Treatment: Prophylactic measure PRINCIPAL DISCHARGE DIAGNOSIS  Diagnosis: Bacteremia  Assessment and Plan of Treatment: You were found to have infection in your blood caused by  E. coli.  You were treated with IV antibiotics and IV fluids.  Your condition has improved.and yourb IV antibiotics is now changed to oral form.  Please complete your oral antibiotics as prescribed.  Let your outpatient provider know if you develop dizziness, nausea skin rash or diarrhea.  Follow up with your primary care provider within 2 weeks after discharge      SECONDARY DISCHARGE DIAGNOSES  Diagnosis: Prostate disease  Assessment and Plan of Treatment: Prostate disease    Diagnosis: Hyperlipidemia  Assessment and Plan of Treatment: Hyperlipidemia    Diagnosis: Prophylactic measure  Assessment and Plan of Treatment: Prophylactic measure    Diagnosis: Prediabetes  Assessment and Plan of Treatment: Prediabetes PRINCIPAL DISCHARGE DIAGNOSIS  Diagnosis: Bacteremia  Assessment and Plan of Treatment: You were found to have infection in your blood caused by  E. coli.  You were treated with IV antibiotics and IV fluids.  Your condition has improved.and yourb IV antibiotics is now changed to oral form.  Please complete your oral antibiotics as prescribed.  Let your outpatient provider know if you develop dizziness, nausea skin rash or diarrhea.  Follow up with your primary care provider within 2 weeks after discharge      SECONDARY DISCHARGE DIAGNOSES  Diagnosis: Prostate disease  Assessment and Plan of Treatment: Follow up with your urolgist in the community    Diagnosis: Hyperlipidemia  Assessment and Plan of Treatment: Continue your medicaation at home  Take your medication at bedtime  Follow up with your outpatient provider    Diagnosis: Prediabetes  Assessment and Plan of Treatment: Your a1c is 5.8%   Continue with consistent carbohydrate with no snacks at home  Exercise as tolerated  Follow up with your outpatient provider    Diagnosis: Gout  Assessment and Plan of Treatment: Gout  causes attacks of pain and swelling in one more joints.  Resume taking your medication at home  Follow up with your outpatient provider PRINCIPAL DISCHARGE DIAGNOSIS  Diagnosis: Bacteremia  Assessment and Plan of Treatment: You were found to have infection in your blood caused by  E. coli.  You were treated with IV antibiotics and IV fluids.  Your condition has improved.and yourb IV antibiotics is now changed to oral form.  Please complete your oral antibiotics as prescribed.  Let your outpatient provider know if you develop dizziness, nausea skin rash or diarrhea.  Follow up with your primary care provider within 2 weeks after discharge      SECONDARY DISCHARGE DIAGNOSES  Diagnosis: Acute prostatitis  Assessment and Plan of Treatment: You were  admitted for acute prostatitis  You were treated with IV antibiotics with good result.  Continue taking your antibiotics at home  Follow up with your urologist in the community    Diagnosis: Hyperlipidemia  Assessment and Plan of Treatment: Continue your medicaation at home  Take your medication at bedtime  Follow up with your outpatient provider    Diagnosis: Prediabetes  Assessment and Plan of Treatment: Your a1c is 5.8%   Continue with consistent carbohydrate with no snacks at home  Exercise as tolerated  Follow up with your outpatient provider    Diagnosis: Gout  Assessment and Plan of Treatment: Gout  causes attacks of pain and swelling in one more joints.  Resume taking your medication at home  Follow up with your outpatient provider

## 2019-09-25 NOTE — PROGRESS NOTE ADULT - I WAS PHYSICALLY PRESENT FOR THE KEY PORTIONS OF THE EVALUATION AND MANAGEMENT (E/M) SERVICE PROVIDED.  I AGREE WITH THE ABOVE HISTORY, PHYSICAL, AND PLAN WHICH I HAVE REVIEWED AND EDITED WHERE APPROPRIATE
Ordered  
Please sign orders for lab appt scheduled 5/12/17    
Statement Selected
Statement Selected

## 2019-09-25 NOTE — PROGRESS NOTE ADULT - PROBLEM SELECTOR PLAN 5
-Will repeat in am  -Life style modifications  -Consider statin
IMPROVE VTE Individual Risk Assessment  RISK                                                          Points  [] Previous VTE                                           3  [] Thrombophilia                                        2  [] Lower limb paralysis                              2   [] Current Cancer                                       2   [] Immobilization > 24 hrs                        1  [] ICU/CCU stay > 24 hours                       1  [] Age > 60                                                   1  IMPROVE VTE Score = 1  lovenox 40 mg
DVT PPx- c/w lovenox.
triglyceride -254 mg/d from 135 mg/dl- will recheck

## 2019-09-25 NOTE — PROGRESS NOTE ADULT - PROBLEM SELECTOR PLAN 1
E. Coli Bacteremia likely due to recent prostate Bx  c/w meropnem until tomorrow  will get gentamicin x1 dose for synergy  Will d/c with Bactrim DS1 tab x 25 days more  ID following Dr. Luo

## 2019-09-25 NOTE — PROGRESS NOTE ADULT - REASON FOR ADMISSION
bacteremia s/p prostate biopsy

## 2019-09-25 NOTE — PROGRESS NOTE ADULT - SUBJECTIVE AND OBJECTIVE BOX
Patient is a 54y old  Male who presents with a chief complaint of bacteremia s/p prostate biopsy (25 Sep 2019 15:46)      INTERVAL HPI/OVERNIGHT EVENTS:  T(C): 36.8 (09-25-19 @ 13:56), Max: 37.1 (09-25-19 @ 04:34)  HR: 74 (09-25-19 @ 13:56) (71 - 74)  BP: 114/77 (09-25-19 @ 13:56) (114/77 - 126/82)  RR: 16 (09-25-19 @ 13:56) (16 - 17)  SpO2: 98% (09-25-19 @ 13:56) (98% - 99%)  Wt(kg): --  I&O's Summary      PAST MEDICAL & SURGICAL HISTORY:  No pertinent past medical history      SOCIAL HISTORY  Alcohol:  Tobacco:  Illicit substance use:      FAMILY HISTORY:      LABS:                        13.7   8.87  )-----------( 178      ( 25 Sep 2019 07:01 )             40.8     09-25    138  |  104  |  9   ----------------------------<  100<H>  4.2   |  30  |  0.99    Ca    9.0      25 Sep 2019 07:01          CAPILLARY BLOOD GLUCOSE                MEDICATIONS  (STANDING):  cyanocobalamin Injectable 1000 MICROGram(s) IntraMuscular daily  docosanol 10% Cream 1 Application(s) Topical three times a day  enoxaparin Injectable 40 milliGRAM(s) SubCutaneous daily  ergocalciferol 26028 Unit(s) Oral <User Schedule>  gentamicin   IVPB 200 milliGRAM(s) IV Intermittent once  meropenem  IVPB 1000 milliGRAM(s) IV Intermittent every 8 hours    MEDICATIONS  (PRN):  acetaminophen   Tablet .. 650 milliGRAM(s) Oral every 6 hours PRN Temp greater or equal to 38C (100.4F), Mild Pain (1 - 3), Moderate Pain (4 - 6)      REVIEW OF SYSTEMS:  CONSTITUTIONAL: No fever, weight loss, or fatigue  EYES: No eye pain, visual disturbances, or discharge  ENMT:  No difficulty hearing, tinnitus, vertigo; No sinus or throat pain  NECK: No pain or stiffness  RESPIRATORY: No cough, wheezing, chills or hemoptysis; No shortness of breath  CARDIOVASCULAR: No chest pain, palpitations, dizziness, or leg swelling  GASTROINTESTINAL: No abdominal or epigastric pain. No nausea, vomiting, or hematemesis; No diarrhea or constipation. No melena or hematochezia.  GENITOURINARY: No dysuria, frequency, hematuria, or incontinence  NEUROLOGICAL: No headaches, memory loss, loss of strength, numbness, or tremors  SKIN: No itching, burning, rashes, or lesions   LYMPH NODES: No enlarged glands  ENDOCRINE: No heat or cold intolerance; No hair loss  MUSCULOSKELETAL: No joint pain or swelling; No muscle, back, or extremity pain  PSYCHIATRIC: No depression, anxiety, mood swings, or difficulty sleeping  HEME/LYMPH: No easy bruising, or bleeding gums  ALLERY AND IMMUNOLOGIC: No hives or eczema    RADIOLOGY & ADDITIONAL TESTS:    Imaging Personally Reviewed:  [ ] YES  [ ] NO    Consultant(s) Notes Reviewed:  [ ] YES  [ ] NO    PHYSICAL EXAM:  GENERAL: NAD, well-groomed, well-developed  HEAD:  Atraumatic, Normocephalic  EYES: EOMI, PERRLA, conjunctiva and sclera clear  ENMT: No tonsillar erythema, exudates, or enlargement; Moist mucous membranes, Good dentition, No lesions  NECK: Supple, No JVD, Normal thyroid  NERVOUS SYSTEM:  Alert & Oriented X3, Good concentration; Motor Strength 5/5 B/L upper and lower extremities; DTRs 2+ intact and symmetric  CHEST/LUNG: Clear to percussion bilaterally; No rales, rhonchi, wheezing, or rubs  HEART: Regular rate and rhythm; No murmurs, rubs, or gallops  ABDOMEN: Soft, Nontender, Nondistended; Bowel sounds present  EXTREMITIES:  2+ Peripheral Pulses, No clubbing, cyanosis, or edema  LYMPH: No lymphadenopathy noted  SKIN: No rashes or lesions    Care Collaborated Discussed with Consultants/Other Providers [ ] YES  [ ] NO Patient is a 55 y/o Male who presents with a chief complaint of bacteremia s/p prostate biopsy.   admitted to medicine for management of bacteremia likely due to invasive procedure-prostate bx.     INTERVAL HPI/OVERNIGHT EVENTS:  afebrile overnight, patient with left lower lip lesion  T(C): 36.8 (09-25-19 @ 13:56), Max: 37.1 (09-25-19 @ 04:34)  HR: 74 (09-25-19 @ 13:56) (71 - 74)  BP: 114/77 (09-25-19 @ 13:56) (114/77 - 126/82)  RR: 16 (09-25-19 @ 13:56) (16 - 17)  SpO2: 98% (09-25-19 @ 13:56) (98% - 99%)  Wt(kg): --  I&O's Summary      PAST MEDICAL & SURGICAL HISTORY:  No pertinent past medical history            LABS:                        13.7   8.87  )-----------( 178      ( 25 Sep 2019 07:01 )             40.8     09-25    138  |  104  |  9   ----------------------------<  100<H>  4.2   |  30  |  0.99    Ca    9.0      25 Sep 2019 07:01          CAPILLARY BLOOD GLUCOSE                MEDICATIONS  (STANDING):  cyanocobalamin Injectable 1000 MICROGram(s) IntraMuscular daily  docosanol 10% Cream 1 Application(s) Topical three times a day  enoxaparin Injectable 40 milliGRAM(s) SubCutaneous daily  ergocalciferol 18728 Unit(s) Oral <User Schedule>  gentamicin   IVPB 200 milliGRAM(s) IV Intermittent once  meropenem  IVPB 1000 milliGRAM(s) IV Intermittent every 8 hours    MEDICATIONS  (PRN):  acetaminophen   Tablet .. 650 milliGRAM(s) Oral every 6 hours PRN Temp greater or equal to 38C (100.4F), Mild Pain (1 - 3), Moderate Pain (4 - 6)      REVIEW OF SYSTEMS:  CONSTITUTIONAL: No fever, weight loss, or fatigue  EYES: No eye pain, visual disturbances, or discharge  ENMT:  No difficulty hearing, tinnitus, vertigo; No sinus or throat pain  NECK: No pain or stiffness  RESPIRATORY: No cough, wheezing, chills or hemoptysis; No shortness of breath  CARDIOVASCULAR: No chest pain, palpitations, dizziness, or leg swelling  GASTROINTESTINAL: No abdominal or epigastric pain. No nausea, vomiting, or hematemesis; No diarrhea or constipation. No melena or hematochezia.  GENITOURINARY: No dysuria, frequency, hematuria, or incontinence  NEUROLOGICAL: No headaches, memory loss, loss of strength, numbness, or tremors  SKIN: No itching, burning, rashes, or lower lip lesion   LYMPH NODES: No enlarged glands  ENDOCRINE: No heat or cold intolerance; No hair loss  MUSCULOSKELETAL: No joint pain or swelling; No muscle, back, or extremity pain  PSYCHIATRIC: No depression, anxiety, mood swings, or difficulty sleeping  HEME/LYMPH: No easy bruising, or bleeding gums  ALLERY AND IMMUNOLOGIC: No hives or eczema    RADIOLOGY & ADDITIONAL TESTS:    Imaging Personally Reviewed:  [ ] YES  [ ] NO    Consultant(s) Notes Reviewed:  [ ] YES  [ ] NO    PHYSICAL EXAM:  GENERAL: NAD, well-groomed, well-developed  HEAD:  Atraumatic, Normocephalic  EYES: EOMI, PERRLA, conjunctiva and sclera clear  ENMT: No tonsillar erythema, exudates, or enlargement; Moist mucous membranes, Good dentition, No lesions  NECK: Supple, No JVD, Normal thyroid  NERVOUS SYSTEM:  Alert & Oriented X3, Good concentration; Motor Strength 5/5 B/L upper and lower extremities; DTRs 2+ intact and symmetric  CHEST/LUNG: Clear to percussion bilaterally; No rales, rhonchi, wheezing, or rubs  HEART: Regular rate and rhythm; No murmurs, rubs, or gallops  ABDOMEN: Soft, Nontender, Nondistended; Bowel sounds present  EXTREMITIES:  2+ Peripheral Pulses, No clubbing, cyanosis, or edema  LYMPH: No lymphadenopathy noted  SKIN: No rashes or lesions    Care Collaborated Discussed with Consultants/Other Providers [ ] YES  [ ] NO Patient is a 53 y/o Male who presents with a chief complaint of bacteremia s/p prostate biopsy.   admitted to medicine for management of bacteremia likely due to invasive procedure-prostate bx.     INTERVAL HPI/OVERNIGHT EVENTS:  afebrile overnight, patient with left lower lip lesion  T(C): 36.8 (09-25-19 @ 13:56), Max: 37.1 (09-25-19 @ 04:34)  HR: 74 (09-25-19 @ 13:56) (71 - 74)  BP: 114/77 (09-25-19 @ 13:56) (114/77 - 126/82)  RR: 16 (09-25-19 @ 13:56) (16 - 17)  SpO2: 98% (09-25-19 @ 13:56) (98% - 99%)  Wt(kg): --  I&O's Summary      PAST MEDICAL & SURGICAL HISTORY:  No pertinent past medical history            LABS:                        13.7   8.87  )-----------( 178      ( 25 Sep 2019 07:01 )             40.8     09-25    138  |  104  |  9   ----------------------------<  100<H>  4.2   |  30  |  0.99    Ca    9.0      25 Sep 2019 07:01          CAPILLARY BLOOD GLUCOSE                MEDICATIONS  (STANDING):  cyanocobalamin Injectable 1000 MICROGram(s) IntraMuscular daily  docosanol 10% Cream 1 Application(s) Topical three times a day  enoxaparin Injectable 40 milliGRAM(s) SubCutaneous daily  ergocalciferol 74445 Unit(s) Oral <User Schedule>  gentamicin   IVPB 200 milliGRAM(s) IV Intermittent once  meropenem  IVPB 1000 milliGRAM(s) IV Intermittent every 8 hours    MEDICATIONS  (PRN):  acetaminophen   Tablet .. 650 milliGRAM(s) Oral every 6 hours PRN Temp greater or equal to 38C (100.4F), Mild Pain (1 - 3), Moderate Pain (4 - 6)      REVIEW OF SYSTEMS:  CONSTITUTIONAL: No fever, weight loss, or fatigue  EYES: No eye pain, visual disturbances, or discharge  ENMT:  No difficulty hearing, tinnitus, vertigo; No sinus or throat pain  NECK: No pain or stiffness  RESPIRATORY: No cough, wheezing, chills or hemoptysis; No shortness of breath  CARDIOVASCULAR: No chest pain, palpitations, dizziness, or leg swelling  GASTROINTESTINAL: No abdominal or epigastric pain. No nausea, vomiting, or hematemesis; No diarrhea or constipation. No melena or hematochezia.  GENITOURINARY: No dysuria, frequency, hematuria, or incontinence  NEUROLOGICAL: No headaches, memory loss, loss of strength, numbness, or tremors  SKIN: No itching, burning, rashes, or lower lip lesion   LYMPH NODES: No enlarged glands  ENDOCRINE: No heat or cold intolerance; No hair loss  MUSCULOSKELETAL: No joint pain or swelling; No muscle, back, or extremity pain  PSYCHIATRIC: No depression, anxiety, mood swings, or difficulty sleeping  ALLERGY AND IMMUNOLOGIC: No hives or eczema    RADIOLOGY & ADDITIONAL TESTS:      PHYSICAL EXAM:  GENERAL: NAD, well-groomed, well-developed  HEAD:  Atraumatic, Normocephalic  EYES:  conjunctiva and sclera clear  ENMT: No tonsillar erythema, exudates, or enlargement; Moist mucous membranes, Good dentition, No lesions  NECK: Supple, No JVD, Normal thyroid  NERVOUS SYSTEM:  Alert & Oriented X3, Good concentration; Motor Strength 5/5 B/L upper and lower extremities; DTRs 2+ intact and symmetric  CHEST/LUNG: Clear to percussion bilaterally; No rales, rhonchi, wheezing, or rubs  HEART: Regular rate and rhythm; No murmurs, rubs, or gallops  ABDOMEN: Soft, Nontender, Nondistended; Bowel sounds present  EXTREMITIES:  2+ Peripheral Pulses, No clubbing, cyanosis, or edema  LYMPH: No lymphadenopathy noted  SKIN: No rashes or lesion on left lower lip    Care Collaborated Discussed with Consultants/Other Providers [x ] YES  [ ] NO

## 2019-09-25 NOTE — PROGRESS NOTE ADULT - PROBLEM SELECTOR PLAN 3
T-Chol 88, LDL 47, HDL 14, Trig 135  -Will repeat lipid panel in am as HDL and LDL levels are too low  -pt. reports no engagement in physical activity or exercise due to work schedule
a1c-5.8%  consistent carbohydrate diet
pt was on simvastatin 5 mg at home.  will resume.  f/u lipid panel
a1c-5.8%  consistent carbohydrate diet.

## 2019-09-26 VITALS
OXYGEN SATURATION: 99 % | HEART RATE: 79 BPM | RESPIRATION RATE: 16 BRPM | SYSTOLIC BLOOD PRESSURE: 113 MMHG | DIASTOLIC BLOOD PRESSURE: 70 MMHG | TEMPERATURE: 98 F

## 2019-09-26 LAB
ANION GAP SERPL CALC-SCNC: 6 MMOL/L — SIGNIFICANT CHANGE UP (ref 5–17)
BUN SERPL-MCNC: 10 MG/DL — SIGNIFICANT CHANGE UP (ref 7–18)
CALCIUM SERPL-MCNC: 9.3 MG/DL — SIGNIFICANT CHANGE UP (ref 8.4–10.5)
CHLORIDE SERPL-SCNC: 102 MMOL/L — SIGNIFICANT CHANGE UP (ref 96–108)
CO2 SERPL-SCNC: 29 MMOL/L — SIGNIFICANT CHANGE UP (ref 22–31)
CREAT SERPL-MCNC: 1.04 MG/DL — SIGNIFICANT CHANGE UP (ref 0.5–1.3)
GLUCOSE SERPL-MCNC: 100 MG/DL — HIGH (ref 70–99)
HCT VFR BLD CALC: 44.2 % — SIGNIFICANT CHANGE UP (ref 39–50)
HGB BLD-MCNC: 14.6 G/DL — SIGNIFICANT CHANGE UP (ref 13–17)
MCHC RBC-ENTMCNC: 29.4 PG — SIGNIFICANT CHANGE UP (ref 27–34)
MCHC RBC-ENTMCNC: 33 GM/DL — SIGNIFICANT CHANGE UP (ref 32–36)
MCV RBC AUTO: 88.9 FL — SIGNIFICANT CHANGE UP (ref 80–100)
NRBC # BLD: 0 /100 WBCS — SIGNIFICANT CHANGE UP (ref 0–0)
PLATELET # BLD AUTO: 209 K/UL — SIGNIFICANT CHANGE UP (ref 150–400)
POTASSIUM SERPL-MCNC: 4.3 MMOL/L — SIGNIFICANT CHANGE UP (ref 3.5–5.3)
POTASSIUM SERPL-SCNC: 4.3 MMOL/L — SIGNIFICANT CHANGE UP (ref 3.5–5.3)
RBC # BLD: 4.97 M/UL — SIGNIFICANT CHANGE UP (ref 4.2–5.8)
RBC # FLD: 13.4 % — SIGNIFICANT CHANGE UP (ref 10.3–14.5)
SODIUM SERPL-SCNC: 137 MMOL/L — SIGNIFICANT CHANGE UP (ref 135–145)
WBC # BLD: 9.32 K/UL — SIGNIFICANT CHANGE UP (ref 3.8–10.5)
WBC # FLD AUTO: 9.32 K/UL — SIGNIFICANT CHANGE UP (ref 3.8–10.5)

## 2019-09-26 PROCEDURE — 85610 PROTHROMBIN TIME: CPT

## 2019-09-26 PROCEDURE — 83605 ASSAY OF LACTIC ACID: CPT

## 2019-09-26 PROCEDURE — 93005 ELECTROCARDIOGRAM TRACING: CPT

## 2019-09-26 PROCEDURE — 80048 BASIC METABOLIC PNL TOTAL CA: CPT

## 2019-09-26 PROCEDURE — 82607 VITAMIN B-12: CPT

## 2019-09-26 PROCEDURE — 80053 COMPREHEN METABOLIC PANEL: CPT

## 2019-09-26 PROCEDURE — 85730 THROMBOPLASTIN TIME PARTIAL: CPT

## 2019-09-26 PROCEDURE — 80076 HEPATIC FUNCTION PANEL: CPT

## 2019-09-26 PROCEDURE — 83036 HEMOGLOBIN GLYCOSYLATED A1C: CPT

## 2019-09-26 PROCEDURE — 85027 COMPLETE CBC AUTOMATED: CPT

## 2019-09-26 PROCEDURE — 82306 VITAMIN D 25 HYDROXY: CPT

## 2019-09-26 PROCEDURE — 84550 ASSAY OF BLOOD/URIC ACID: CPT

## 2019-09-26 PROCEDURE — 36415 COLL VENOUS BLD VENIPUNCTURE: CPT

## 2019-09-26 PROCEDURE — 81001 URINALYSIS AUTO W/SCOPE: CPT

## 2019-09-26 PROCEDURE — 87086 URINE CULTURE/COLONY COUNT: CPT

## 2019-09-26 PROCEDURE — 96375 TX/PRO/DX INJ NEW DRUG ADDON: CPT

## 2019-09-26 PROCEDURE — 84100 ASSAY OF PHOSPHORUS: CPT

## 2019-09-26 PROCEDURE — 96374 THER/PROPH/DIAG INJ IV PUSH: CPT

## 2019-09-26 PROCEDURE — 87040 BLOOD CULTURE FOR BACTERIA: CPT

## 2019-09-26 PROCEDURE — 96361 HYDRATE IV INFUSION ADD-ON: CPT

## 2019-09-26 PROCEDURE — 99285 EMERGENCY DEPT VISIT HI MDM: CPT | Mod: 25

## 2019-09-26 PROCEDURE — 80061 LIPID PANEL: CPT

## 2019-09-26 PROCEDURE — 84443 ASSAY THYROID STIM HORMONE: CPT

## 2019-09-26 PROCEDURE — 83735 ASSAY OF MAGNESIUM: CPT

## 2019-09-26 RX ORDER — PREGABALIN 225 MG/1
1 CAPSULE ORAL
Qty: 30 | Refills: 0
Start: 2019-09-26 | End: 2019-10-25

## 2019-09-26 RX ORDER — SIMVASTATIN 20 MG/1
1 TABLET, FILM COATED ORAL
Qty: 30 | Refills: 0
Start: 2019-09-26 | End: 2019-10-25

## 2019-09-26 RX ORDER — ALLOPURINOL 300 MG
1 TABLET ORAL
Qty: 30 | Refills: 0
Start: 2019-09-26 | End: 2019-10-25

## 2019-09-26 RX ORDER — ERGOCALCIFEROL 1.25 MG/1
1 CAPSULE ORAL
Qty: 4 | Refills: 0
Start: 2019-09-26 | End: 2019-10-25

## 2019-09-26 RX ORDER — AZTREONAM 2 G
1 VIAL (EA) INJECTION
Qty: 50 | Refills: 0
Start: 2019-09-26 | End: 2019-10-20

## 2019-09-26 RX ORDER — ALLOPURINOL 300 MG
1 TABLET ORAL
Qty: 0 | Refills: 0 | DISCHARGE

## 2019-09-26 RX ORDER — SIMVASTATIN 20 MG/1
1 TABLET, FILM COATED ORAL
Qty: 0 | Refills: 0 | DISCHARGE

## 2019-09-26 RX ADMIN — DOCOSANOL 1 APPLICATION(S): 100 CREAM TOPICAL at 15:04

## 2019-09-26 RX ADMIN — DOCOSANOL 1 APPLICATION(S): 100 CREAM TOPICAL at 06:06

## 2019-09-26 RX ADMIN — MEROPENEM 100 MILLIGRAM(S): 1 INJECTION INTRAVENOUS at 06:06

## 2019-09-26 RX ADMIN — PREGABALIN 1000 MICROGRAM(S): 225 CAPSULE ORAL at 12:04

## 2019-09-26 RX ADMIN — MEROPENEM 100 MILLIGRAM(S): 1 INJECTION INTRAVENOUS at 15:04

## 2019-09-26 NOTE — DISCHARGE NOTE NURSING/CASE MANAGEMENT/SOCIAL WORK - PATIENT PORTAL LINK FT
You can access the FollowMyHealth Patient Portal offered by [facility] by registering at the following website: [email or web address]. By joining [employer]’s FollowMyHealth portal, you will also be able to view your health information using other applications (apps) compatible with our system.

## 2019-09-27 LAB
CULTURE RESULTS: SIGNIFICANT CHANGE UP
CULTURE RESULTS: SIGNIFICANT CHANGE UP
SPECIMEN SOURCE: SIGNIFICANT CHANGE UP
SPECIMEN SOURCE: SIGNIFICANT CHANGE UP

## 2019-09-30 LAB
CULTURE RESULTS: SIGNIFICANT CHANGE UP
SPECIMEN SOURCE: SIGNIFICANT CHANGE UP

## 2019-11-09 ENCOUNTER — EMERGENCY (EMERGENCY)
Facility: HOSPITAL | Age: 54
LOS: 1 days | Discharge: HOME | End: 2019-11-09
Attending: EMERGENCY MEDICINE
Payer: MEDICAID

## 2019-11-09 VITALS
HEART RATE: 79 BPM | HEIGHT: 61 IN | SYSTOLIC BLOOD PRESSURE: 129 MMHG | OXYGEN SATURATION: 97 % | TEMPERATURE: 98 F | DIASTOLIC BLOOD PRESSURE: 83 MMHG | WEIGHT: 199.96 LBS | RESPIRATION RATE: 16 BRPM

## 2019-11-09 VITALS
SYSTOLIC BLOOD PRESSURE: 116 MMHG | HEART RATE: 60 BPM | RESPIRATION RATE: 18 BRPM | TEMPERATURE: 98 F | OXYGEN SATURATION: 97 % | DIASTOLIC BLOOD PRESSURE: 65 MMHG

## 2019-11-09 LAB
ALBUMIN SERPL ELPH-MCNC: 3.7 G/DL — SIGNIFICANT CHANGE UP (ref 3.5–5)
ALP SERPL-CCNC: 117 U/L — SIGNIFICANT CHANGE UP (ref 40–120)
ALT FLD-CCNC: 41 U/L DA — SIGNIFICANT CHANGE UP (ref 10–60)
AMPHET UR-MCNC: NEGATIVE — SIGNIFICANT CHANGE UP
ANION GAP SERPL CALC-SCNC: 7 MMOL/L — SIGNIFICANT CHANGE UP (ref 5–17)
APPEARANCE UR: CLEAR — SIGNIFICANT CHANGE UP
APTT BLD: 32.2 SEC — SIGNIFICANT CHANGE UP (ref 27.5–36.3)
AST SERPL-CCNC: 29 U/L — SIGNIFICANT CHANGE UP (ref 10–40)
BARBITURATES UR SCN-MCNC: NEGATIVE — SIGNIFICANT CHANGE UP
BASOPHILS # BLD AUTO: 0.02 K/UL — SIGNIFICANT CHANGE UP (ref 0–0.2)
BASOPHILS NFR BLD AUTO: 0.2 % — SIGNIFICANT CHANGE UP (ref 0–2)
BENZODIAZ UR-MCNC: NEGATIVE — SIGNIFICANT CHANGE UP
BILIRUB SERPL-MCNC: 0.5 MG/DL — SIGNIFICANT CHANGE UP (ref 0.2–1.2)
BILIRUB UR-MCNC: NEGATIVE — SIGNIFICANT CHANGE UP
BUN SERPL-MCNC: 11 MG/DL — SIGNIFICANT CHANGE UP (ref 7–18)
CALCIUM SERPL-MCNC: 8.5 MG/DL — SIGNIFICANT CHANGE UP (ref 8.4–10.5)
CHLORIDE SERPL-SCNC: 108 MMOL/L — SIGNIFICANT CHANGE UP (ref 96–108)
CK SERPL-CCNC: 95 U/L — SIGNIFICANT CHANGE UP (ref 35–232)
CO2 SERPL-SCNC: 24 MMOL/L — SIGNIFICANT CHANGE UP (ref 22–31)
COCAINE METAB.OTHER UR-MCNC: NEGATIVE — SIGNIFICANT CHANGE UP
COLOR SPEC: YELLOW — SIGNIFICANT CHANGE UP
CREAT SERPL-MCNC: 0.99 MG/DL — SIGNIFICANT CHANGE UP (ref 0.5–1.3)
D DIMER BLD IA.RAPID-MCNC: 189 NG/ML DDU — SIGNIFICANT CHANGE UP
DIFF PNL FLD: NEGATIVE — SIGNIFICANT CHANGE UP
EOSINOPHIL # BLD AUTO: 0.09 K/UL — SIGNIFICANT CHANGE UP (ref 0–0.5)
EOSINOPHIL NFR BLD AUTO: 1.1 % — SIGNIFICANT CHANGE UP (ref 0–6)
GLUCOSE SERPL-MCNC: 125 MG/DL — HIGH (ref 70–99)
GLUCOSE UR QL: NEGATIVE — SIGNIFICANT CHANGE UP
HCT VFR BLD CALC: 40.2 % — SIGNIFICANT CHANGE UP (ref 39–50)
HGB BLD-MCNC: 13.7 G/DL — SIGNIFICANT CHANGE UP (ref 13–17)
IMM GRANULOCYTES NFR BLD AUTO: 0.1 % — SIGNIFICANT CHANGE UP (ref 0–1.5)
INR BLD: 0.96 RATIO — SIGNIFICANT CHANGE UP (ref 0.88–1.16)
KETONES UR-MCNC: NEGATIVE — SIGNIFICANT CHANGE UP
LEUKOCYTE ESTERASE UR-ACNC: NEGATIVE — SIGNIFICANT CHANGE UP
LYMPHOCYTES # BLD AUTO: 2.75 K/UL — SIGNIFICANT CHANGE UP (ref 1–3.3)
LYMPHOCYTES # BLD AUTO: 33.7 % — SIGNIFICANT CHANGE UP (ref 13–44)
MAGNESIUM SERPL-MCNC: 2.3 MG/DL — SIGNIFICANT CHANGE UP (ref 1.6–2.6)
MCHC RBC-ENTMCNC: 30.2 PG — SIGNIFICANT CHANGE UP (ref 27–34)
MCHC RBC-ENTMCNC: 34.1 GM/DL — SIGNIFICANT CHANGE UP (ref 32–36)
MCV RBC AUTO: 88.7 FL — SIGNIFICANT CHANGE UP (ref 80–100)
METHADONE UR-MCNC: NEGATIVE — SIGNIFICANT CHANGE UP
MONOCYTES # BLD AUTO: 0.73 K/UL — SIGNIFICANT CHANGE UP (ref 0–0.9)
MONOCYTES NFR BLD AUTO: 8.9 % — SIGNIFICANT CHANGE UP (ref 2–14)
NEUTROPHILS # BLD AUTO: 4.56 K/UL — SIGNIFICANT CHANGE UP (ref 1.8–7.4)
NEUTROPHILS NFR BLD AUTO: 56 % — SIGNIFICANT CHANGE UP (ref 43–77)
NITRITE UR-MCNC: NEGATIVE — SIGNIFICANT CHANGE UP
NRBC # BLD: 0 /100 WBCS — SIGNIFICANT CHANGE UP (ref 0–0)
OPIATES UR-MCNC: NEGATIVE — SIGNIFICANT CHANGE UP
PCP SPEC-MCNC: SIGNIFICANT CHANGE UP
PCP UR-MCNC: NEGATIVE — SIGNIFICANT CHANGE UP
PH UR: 7 — SIGNIFICANT CHANGE UP (ref 5–8)
PLATELET # BLD AUTO: 272 K/UL — SIGNIFICANT CHANGE UP (ref 150–400)
POTASSIUM SERPL-MCNC: 4.1 MMOL/L — SIGNIFICANT CHANGE UP (ref 3.5–5.3)
POTASSIUM SERPL-SCNC: 4.1 MMOL/L — SIGNIFICANT CHANGE UP (ref 3.5–5.3)
PROT SERPL-MCNC: 7.9 G/DL — SIGNIFICANT CHANGE UP (ref 6–8.3)
PROT UR-MCNC: NEGATIVE — SIGNIFICANT CHANGE UP
PROTHROM AB SERPL-ACNC: 10.6 SEC — SIGNIFICANT CHANGE UP (ref 10–12.9)
RBC # BLD: 4.53 M/UL — SIGNIFICANT CHANGE UP (ref 4.2–5.8)
RBC # FLD: 13.1 % — SIGNIFICANT CHANGE UP (ref 10.3–14.5)
SODIUM SERPL-SCNC: 139 MMOL/L — SIGNIFICANT CHANGE UP (ref 135–145)
SP GR SPEC: 1 — LOW (ref 1.01–1.02)
THC UR QL: NEGATIVE — SIGNIFICANT CHANGE UP
TROPONIN I SERPL-MCNC: <0.015 NG/ML — SIGNIFICANT CHANGE UP (ref 0–0.04)
UROBILINOGEN FLD QL: NEGATIVE — SIGNIFICANT CHANGE UP
WBC # BLD: 8.16 K/UL — SIGNIFICANT CHANGE UP (ref 3.8–10.5)
WBC # FLD AUTO: 8.16 K/UL — SIGNIFICANT CHANGE UP (ref 3.8–10.5)

## 2019-11-09 PROCEDURE — 85379 FIBRIN DEGRADATION QUANT: CPT

## 2019-11-09 PROCEDURE — 93005 ELECTROCARDIOGRAM TRACING: CPT

## 2019-11-09 PROCEDURE — 82550 ASSAY OF CK (CPK): CPT

## 2019-11-09 PROCEDURE — 99284 EMERGENCY DEPT VISIT MOD MDM: CPT | Mod: 25

## 2019-11-09 PROCEDURE — 83735 ASSAY OF MAGNESIUM: CPT

## 2019-11-09 PROCEDURE — 71046 X-RAY EXAM CHEST 2 VIEWS: CPT

## 2019-11-09 PROCEDURE — 71260 CT THORAX DX C+: CPT

## 2019-11-09 PROCEDURE — 71046 X-RAY EXAM CHEST 2 VIEWS: CPT | Mod: 26

## 2019-11-09 PROCEDURE — 99284 EMERGENCY DEPT VISIT MOD MDM: CPT

## 2019-11-09 PROCEDURE — 84484 ASSAY OF TROPONIN QUANT: CPT

## 2019-11-09 PROCEDURE — 85730 THROMBOPLASTIN TIME PARTIAL: CPT

## 2019-11-09 PROCEDURE — 71260 CT THORAX DX C+: CPT | Mod: 26

## 2019-11-09 PROCEDURE — 81003 URINALYSIS AUTO W/O SCOPE: CPT

## 2019-11-09 PROCEDURE — 80307 DRUG TEST PRSMV CHEM ANLYZR: CPT

## 2019-11-09 PROCEDURE — 85027 COMPLETE CBC AUTOMATED: CPT

## 2019-11-09 PROCEDURE — 36415 COLL VENOUS BLD VENIPUNCTURE: CPT

## 2019-11-09 PROCEDURE — 96374 THER/PROPH/DIAG INJ IV PUSH: CPT | Mod: XU

## 2019-11-09 PROCEDURE — 85610 PROTHROMBIN TIME: CPT

## 2019-11-09 PROCEDURE — 93010 ELECTROCARDIOGRAM REPORT: CPT

## 2019-11-09 PROCEDURE — 80053 COMPREHEN METABOLIC PANEL: CPT

## 2019-11-09 RX ORDER — IBUPROFEN 200 MG
1 TABLET ORAL
Qty: 30 | Refills: 0
Start: 2019-11-09 | End: 2019-11-18

## 2019-11-09 RX ORDER — KETOROLAC TROMETHAMINE 30 MG/ML
30 SYRINGE (ML) INJECTION ONCE
Refills: 0 | Status: DISCONTINUED | OUTPATIENT
Start: 2019-11-09 | End: 2019-11-09

## 2019-11-09 RX ADMIN — Medication 30 MILLIGRAM(S): at 18:30

## 2019-11-09 NOTE — ED PROVIDER NOTE - PATIENT PORTAL LINK FT
You can access the FollowMyHealth Patient Portal offered by [facility] by registering at the following website: [email or web address]. By joining [employer]’s FollowMyHealth portal, you will also be able to view your health information using other applications (apps) compatible with our system.

## 2019-11-09 NOTE — ED PROVIDER NOTE - CLINICAL SUMMARY MEDICAL DECISION MAKING FREE TEXT BOX
Patient with right sided chest pain, most likely musculoskeletal atypical. Given history of prostate cancer will get labs and d-dimer and reassess.

## 2019-11-09 NOTE — ED PROVIDER NOTE - OBJECTIVE STATEMENT
53 y/o male with a PMHx of prostate cancer with no metastasis and then developing bacteremia after the biopsy, gout and high cholesterol presents to the ED c/o R sided chest pain x5 days ago. Patient states that he ate some spicy food and then the pain started . Patient describes the pain as on and off and it hurts more when he moves. Patient also states that he feels shortness of breath when he walks too much. Patient states that he went to a local doctor and was given omeprazole. Patient states that it helped somewhat. Patient denies coughing, fever or any other acute complaints. NKDA

## 2019-11-16 ENCOUNTER — EMERGENCY (EMERGENCY)
Facility: HOSPITAL | Age: 54
LOS: 1 days | Discharge: HOME | End: 2019-11-16
Attending: EMERGENCY MEDICINE
Payer: MEDICAID

## 2019-11-16 VITALS
HEIGHT: 66 IN | SYSTOLIC BLOOD PRESSURE: 123 MMHG | OXYGEN SATURATION: 97 % | WEIGHT: 199.96 LBS | RESPIRATION RATE: 18 BRPM | HEART RATE: 73 BPM | TEMPERATURE: 98 F | DIASTOLIC BLOOD PRESSURE: 83 MMHG

## 2019-11-16 VITALS
OXYGEN SATURATION: 99 % | HEART RATE: 66 BPM | RESPIRATION RATE: 18 BRPM | DIASTOLIC BLOOD PRESSURE: 67 MMHG | SYSTOLIC BLOOD PRESSURE: 116 MMHG | TEMPERATURE: 98 F

## 2019-11-16 PROBLEM — E78.00 PURE HYPERCHOLESTEROLEMIA, UNSPECIFIED: Chronic | Status: ACTIVE | Noted: 2019-11-09

## 2019-11-16 PROBLEM — C61 MALIGNANT NEOPLASM OF PROSTATE: Chronic | Status: ACTIVE | Noted: 2019-11-09

## 2019-11-16 PROBLEM — M10.9 GOUT, UNSPECIFIED: Chronic | Status: ACTIVE | Noted: 2019-11-09

## 2019-11-16 LAB
ALBUMIN SERPL ELPH-MCNC: 3.6 G/DL — SIGNIFICANT CHANGE UP (ref 3.5–5)
ALP SERPL-CCNC: 122 U/L — HIGH (ref 40–120)
ALT FLD-CCNC: 43 U/L DA — SIGNIFICANT CHANGE UP (ref 10–60)
ANION GAP SERPL CALC-SCNC: 5 MMOL/L — SIGNIFICANT CHANGE UP (ref 5–17)
APPEARANCE UR: CLEAR — SIGNIFICANT CHANGE UP
AST SERPL-CCNC: 24 U/L — SIGNIFICANT CHANGE UP (ref 10–40)
BASOPHILS # BLD AUTO: 0.03 K/UL — SIGNIFICANT CHANGE UP (ref 0–0.2)
BASOPHILS NFR BLD AUTO: 0.3 % — SIGNIFICANT CHANGE UP (ref 0–2)
BILIRUB SERPL-MCNC: 0.5 MG/DL — SIGNIFICANT CHANGE UP (ref 0.2–1.2)
BILIRUB UR-MCNC: NEGATIVE — SIGNIFICANT CHANGE UP
BUN SERPL-MCNC: 7 MG/DL — SIGNIFICANT CHANGE UP (ref 7–18)
CALCIUM SERPL-MCNC: 8.7 MG/DL — SIGNIFICANT CHANGE UP (ref 8.4–10.5)
CHLORIDE SERPL-SCNC: 104 MMOL/L — SIGNIFICANT CHANGE UP (ref 96–108)
CO2 SERPL-SCNC: 29 MMOL/L — SIGNIFICANT CHANGE UP (ref 22–31)
COLOR SPEC: YELLOW — SIGNIFICANT CHANGE UP
CREAT SERPL-MCNC: 1 MG/DL — SIGNIFICANT CHANGE UP (ref 0.5–1.3)
DIFF PNL FLD: NEGATIVE — SIGNIFICANT CHANGE UP
EOSINOPHIL # BLD AUTO: 0.1 K/UL — SIGNIFICANT CHANGE UP (ref 0–0.5)
EOSINOPHIL NFR BLD AUTO: 1 % — SIGNIFICANT CHANGE UP (ref 0–6)
GLUCOSE SERPL-MCNC: 102 MG/DL — HIGH (ref 70–99)
GLUCOSE UR QL: NEGATIVE — SIGNIFICANT CHANGE UP
HCT VFR BLD CALC: 42.9 % — SIGNIFICANT CHANGE UP (ref 39–50)
HGB BLD-MCNC: 14.4 G/DL — SIGNIFICANT CHANGE UP (ref 13–17)
IMM GRANULOCYTES NFR BLD AUTO: 0.4 % — SIGNIFICANT CHANGE UP (ref 0–1.5)
KETONES UR-MCNC: NEGATIVE — SIGNIFICANT CHANGE UP
LEUKOCYTE ESTERASE UR-ACNC: NEGATIVE — SIGNIFICANT CHANGE UP
LIDOCAIN IGE QN: 203 U/L — SIGNIFICANT CHANGE UP (ref 73–393)
LYMPHOCYTES # BLD AUTO: 2.98 K/UL — SIGNIFICANT CHANGE UP (ref 1–3.3)
LYMPHOCYTES # BLD AUTO: 28.5 % — SIGNIFICANT CHANGE UP (ref 13–44)
MCHC RBC-ENTMCNC: 30.3 PG — SIGNIFICANT CHANGE UP (ref 27–34)
MCHC RBC-ENTMCNC: 33.6 GM/DL — SIGNIFICANT CHANGE UP (ref 32–36)
MCV RBC AUTO: 90.1 FL — SIGNIFICANT CHANGE UP (ref 80–100)
MONOCYTES # BLD AUTO: 0.94 K/UL — HIGH (ref 0–0.9)
MONOCYTES NFR BLD AUTO: 9 % — SIGNIFICANT CHANGE UP (ref 2–14)
NEUTROPHILS # BLD AUTO: 6.38 K/UL — SIGNIFICANT CHANGE UP (ref 1.8–7.4)
NEUTROPHILS NFR BLD AUTO: 60.8 % — SIGNIFICANT CHANGE UP (ref 43–77)
NITRITE UR-MCNC: NEGATIVE — SIGNIFICANT CHANGE UP
NRBC # BLD: 0 /100 WBCS — SIGNIFICANT CHANGE UP (ref 0–0)
PH UR: 7 — SIGNIFICANT CHANGE UP (ref 5–8)
PLATELET # BLD AUTO: 263 K/UL — SIGNIFICANT CHANGE UP (ref 150–400)
POTASSIUM SERPL-MCNC: 3.7 MMOL/L — SIGNIFICANT CHANGE UP (ref 3.5–5.3)
POTASSIUM SERPL-SCNC: 3.7 MMOL/L — SIGNIFICANT CHANGE UP (ref 3.5–5.3)
PROT SERPL-MCNC: 8.4 G/DL — HIGH (ref 6–8.3)
PROT UR-MCNC: NEGATIVE — SIGNIFICANT CHANGE UP
RBC # BLD: 4.76 M/UL — SIGNIFICANT CHANGE UP (ref 4.2–5.8)
RBC # FLD: 13 % — SIGNIFICANT CHANGE UP (ref 10.3–14.5)
SODIUM SERPL-SCNC: 138 MMOL/L — SIGNIFICANT CHANGE UP (ref 135–145)
SP GR SPEC: 1.01 — SIGNIFICANT CHANGE UP (ref 1.01–1.02)
UROBILINOGEN FLD QL: NEGATIVE — SIGNIFICANT CHANGE UP
WBC # BLD: 10.47 K/UL — SIGNIFICANT CHANGE UP (ref 3.8–10.5)
WBC # FLD AUTO: 10.47 K/UL — SIGNIFICANT CHANGE UP (ref 3.8–10.5)

## 2019-11-16 PROCEDURE — 36415 COLL VENOUS BLD VENIPUNCTURE: CPT

## 2019-11-16 PROCEDURE — 80053 COMPREHEN METABOLIC PANEL: CPT

## 2019-11-16 PROCEDURE — 99284 EMERGENCY DEPT VISIT MOD MDM: CPT

## 2019-11-16 PROCEDURE — 83690 ASSAY OF LIPASE: CPT

## 2019-11-16 PROCEDURE — 87086 URINE CULTURE/COLONY COUNT: CPT

## 2019-11-16 PROCEDURE — 99284 EMERGENCY DEPT VISIT MOD MDM: CPT | Mod: 25

## 2019-11-16 PROCEDURE — 96374 THER/PROPH/DIAG INJ IV PUSH: CPT

## 2019-11-16 PROCEDURE — 81003 URINALYSIS AUTO W/O SCOPE: CPT

## 2019-11-16 PROCEDURE — 85027 COMPLETE CBC AUTOMATED: CPT

## 2019-11-16 RX ORDER — ACETAMINOPHEN 500 MG
1000 TABLET ORAL ONCE
Refills: 0 | Status: COMPLETED | OUTPATIENT
Start: 2019-11-16 | End: 2019-11-16

## 2019-11-16 RX ADMIN — Medication 400 MILLIGRAM(S): at 09:53

## 2019-11-16 NOTE — ED ADULT TRIAGE NOTE - CHIEF COMPLAINT QUOTE
c/o abdominal , rectal pain x 2 days , frequent urine . Diagnosed w/ Prostate Cancer  3 months ago . reports s/p Space Oar Hydrogel Preparation 2 days ago

## 2019-11-16 NOTE — ED ADULT NURSE NOTE - OBJECTIVE STATEMENT
Pt AOx4, ambulatory, h/o prostate Ca, c/o urinary frequency, lower abdominal pain, and diarrhea x 2 days. Pt reports pre-radiation procedure with oncologist 2 days ago. Pt denies CP, SOB, hematuria, dysuria.

## 2019-11-16 NOTE — ED PROVIDER NOTE - OBJECTIVE STATEMENT
55 y/o M with prostate cancer and recent spaceoar hydrogel preparation x2 days ago c/o urinary frequency, lower abdomen and rectal discomfort since yesterday evening. Pt notes that he took some Ibuprofen with some relief. Pt also relates increased stool frequency with solid consistency.  Pt has been taking Cipro and contacted his urologist yesterday where he was told by the  to go to the ED for concerns. Pt denies any nausea, vomiting, fever, diarrhea or any other complaints.

## 2019-11-16 NOTE — ED PROVIDER NOTE - PROGRESS NOTE DETAILS
I spoke with pt/s urologist Dr Crowley who said some discomfort and urinary frequency are expected. Instructed patient to call office on monday. patient planned for outpatient CT scan on tuesday for radiation planning

## 2019-11-16 NOTE — ED PROVIDER NOTE - PATIENT PORTAL LINK FT
You can access the FollowMyHealth Patient Portal offered by [facility] by registering at the following website: [email or web address]. By joining [employer]’s FollowMyHealth portal, you will also be able to view your health information using other applications (apps) compatible with our system.

## 2019-11-16 NOTE — ED PROVIDER NOTE - CLINICAL SUMMARY MEDICAL DECISION MAKING FREE TEXT BOX
Patient presenting to the ED with abdominal and rectal discomfort, and increased urinary and stool frequency. Patient is well appearing. Will obtain labs, urine analysis, and attempt to contact urologist.

## 2019-11-17 LAB
CULTURE RESULTS: NO GROWTH — SIGNIFICANT CHANGE UP
SPECIMEN SOURCE: SIGNIFICANT CHANGE UP

## 2020-05-15 NOTE — ED PROVIDER NOTE - CPE EDP SKIN NORM
Clinically improved.  Call if increasing shortness of breath or chest pains.  Call if increased fevers or chills.  Call if O2 sats drop below 90%.  Follow-up for video encounter in 10 days.  No work for now.  Proper appetite.  Proper diet.  Proper sleep.  Ambulate in the room.  Continue to self quarantine.  
normal...

## 2021-04-09 NOTE — H&P ADULT - PROBLEM SELECTOR PROBLEM 1
Patient remains in sinus rhythm following catheter ablation.  Continue Xarelto per cardiology.  Amiodarone to be stopped on May 9.  Await thyroid studies.   Bacteremia

## 2021-08-09 ENCOUNTER — EMERGENCY (EMERGENCY)
Facility: HOSPITAL | Age: 56
LOS: 1 days | Discharge: HOME | End: 2021-08-09
Attending: EMERGENCY MEDICINE
Payer: MEDICAID

## 2021-08-09 VITALS
DIASTOLIC BLOOD PRESSURE: 78 MMHG | SYSTOLIC BLOOD PRESSURE: 122 MMHG | HEIGHT: 66.54 IN | OXYGEN SATURATION: 97 % | RESPIRATION RATE: 18 BRPM | WEIGHT: 211.64 LBS | HEART RATE: 90 BPM | TEMPERATURE: 98 F

## 2021-08-09 LAB
ALBUMIN SERPL ELPH-MCNC: 3.8 G/DL — SIGNIFICANT CHANGE UP (ref 3.5–5)
ALP SERPL-CCNC: 89 U/L — SIGNIFICANT CHANGE UP (ref 40–120)
ALT FLD-CCNC: 36 U/L DA — SIGNIFICANT CHANGE UP (ref 10–60)
ANION GAP SERPL CALC-SCNC: 2 MMOL/L — LOW (ref 5–17)
AST SERPL-CCNC: 27 U/L — SIGNIFICANT CHANGE UP (ref 10–40)
BASOPHILS # BLD AUTO: 0.03 K/UL — SIGNIFICANT CHANGE UP (ref 0–0.2)
BASOPHILS NFR BLD AUTO: 0.5 % — SIGNIFICANT CHANGE UP (ref 0–2)
BILIRUB SERPL-MCNC: 0.5 MG/DL — SIGNIFICANT CHANGE UP (ref 0.2–1.2)
BUN SERPL-MCNC: 14 MG/DL — SIGNIFICANT CHANGE UP (ref 7–18)
CALCIUM SERPL-MCNC: 8.6 MG/DL — SIGNIFICANT CHANGE UP (ref 8.4–10.5)
CHLORIDE SERPL-SCNC: 111 MMOL/L — HIGH (ref 96–108)
CO2 SERPL-SCNC: 29 MMOL/L — SIGNIFICANT CHANGE UP (ref 22–31)
CREAT SERPL-MCNC: 1.14 MG/DL — SIGNIFICANT CHANGE UP (ref 0.5–1.3)
D DIMER BLD IA.RAPID-MCNC: <150 NG/ML DDU — SIGNIFICANT CHANGE UP
EOSINOPHIL # BLD AUTO: 0.12 K/UL — SIGNIFICANT CHANGE UP (ref 0–0.5)
EOSINOPHIL NFR BLD AUTO: 2 % — SIGNIFICANT CHANGE UP (ref 0–6)
GLUCOSE SERPL-MCNC: 95 MG/DL — SIGNIFICANT CHANGE UP (ref 70–99)
HCT VFR BLD CALC: 41.1 % — SIGNIFICANT CHANGE UP (ref 39–50)
HGB BLD-MCNC: 13.8 G/DL — SIGNIFICANT CHANGE UP (ref 13–17)
IMM GRANULOCYTES NFR BLD AUTO: 0.2 % — SIGNIFICANT CHANGE UP (ref 0–1.5)
LYMPHOCYTES # BLD AUTO: 2.26 K/UL — SIGNIFICANT CHANGE UP (ref 1–3.3)
LYMPHOCYTES # BLD AUTO: 38.1 % — SIGNIFICANT CHANGE UP (ref 13–44)
MCHC RBC-ENTMCNC: 29.7 PG — SIGNIFICANT CHANGE UP (ref 27–34)
MCHC RBC-ENTMCNC: 33.6 GM/DL — SIGNIFICANT CHANGE UP (ref 32–36)
MCV RBC AUTO: 88.4 FL — SIGNIFICANT CHANGE UP (ref 80–100)
MONOCYTES # BLD AUTO: 0.64 K/UL — SIGNIFICANT CHANGE UP (ref 0–0.9)
MONOCYTES NFR BLD AUTO: 10.8 % — SIGNIFICANT CHANGE UP (ref 2–14)
NEUTROPHILS # BLD AUTO: 2.87 K/UL — SIGNIFICANT CHANGE UP (ref 1.8–7.4)
NEUTROPHILS NFR BLD AUTO: 48.4 % — SIGNIFICANT CHANGE UP (ref 43–77)
NRBC # BLD: 0 /100 WBCS — SIGNIFICANT CHANGE UP (ref 0–0)
PLATELET # BLD AUTO: 239 K/UL — SIGNIFICANT CHANGE UP (ref 150–400)
POTASSIUM SERPL-MCNC: 4.2 MMOL/L — SIGNIFICANT CHANGE UP (ref 3.5–5.3)
POTASSIUM SERPL-SCNC: 4.2 MMOL/L — SIGNIFICANT CHANGE UP (ref 3.5–5.3)
PROT SERPL-MCNC: 8.3 G/DL — SIGNIFICANT CHANGE UP (ref 6–8.3)
RBC # BLD: 4.65 M/UL — SIGNIFICANT CHANGE UP (ref 4.2–5.8)
RBC # FLD: 11.9 % — SIGNIFICANT CHANGE UP (ref 10.3–14.5)
SODIUM SERPL-SCNC: 142 MMOL/L — SIGNIFICANT CHANGE UP (ref 135–145)
TROPONIN I SERPL-MCNC: <0.015 NG/ML — SIGNIFICANT CHANGE UP (ref 0–0.04)
WBC # BLD: 5.93 K/UL — SIGNIFICANT CHANGE UP (ref 3.8–10.5)
WBC # FLD AUTO: 5.93 K/UL — SIGNIFICANT CHANGE UP (ref 3.8–10.5)

## 2021-08-09 PROCEDURE — 99285 EMERGENCY DEPT VISIT HI MDM: CPT

## 2021-08-09 PROCEDURE — 71046 X-RAY EXAM CHEST 2 VIEWS: CPT | Mod: 26

## 2021-08-09 NOTE — ED PROVIDER NOTE - PATIENT PORTAL LINK FT
You can access the FollowMyHealth Patient Portal offered by [facility] by registering at the following website: [email or web address]. By joining [employer]’s FollowMyHealth portal, you will also be able to view your health information using other applications (apps) compatible with our system.

## 2021-08-09 NOTE — ED PROVIDER NOTE - NSFOLLOWUPINSTRUCTIONS_ED_ALL_ED_FT
Chest Pain    WHAT YOU NEED TO KNOW:    Chest pain can be caused by a range of conditions, from not serious to life-threatening. Chest pain can be a symptom of a digestive problem, such as acid reflux or a stomach ulcer. An anxiety attack or a strong emotion, such as anger, can also cause chest pain. Infection, inflammation, or a fracture in the bones or cartilage in your chest can cause pain or discomfort. Sometimes chest pain or pressure is caused by poor blood flow to your heart (angina). Chest pain may also be caused by life-threatening conditions such as a heart attack or blood clot in your lungs.    DISCHARGE INSTRUCTIONS:    Call your local emergency number (911 in the [place]) or have someone call if:   •You have any of the following signs of a heart attack: ?Squeezing, pressure, or pain in your chest      ?You may also have any of the following: ?Discomfort or pain in your back, neck, jaw, stomach, or arm      ?Shortness of breath      ?Nausea or vomiting      ?Lightheadedness or a sudden cold sweat            Return to the emergency department if:   •You have chest discomfort that gets worse, even with medicine.      •You cough or vomit blood.      •Your bowel movements are black or bloody.      •You cannot stop vomiting, or it hurts to swallow.      Call your doctor if:   •You have questions or concerns about your condition or care.          Medicines:   •Medicines may be given to treat the cause of your chest pain. Examples include pain medicine, anxiety medicine, or medicines to increase blood flow to your heart.      •Do not take certain medicines without asking your healthcare provider first. These include NSAIDs, herbal or vitamin supplements, or hormones (estrogen or progestin).      •Take your medicine as directed. Contact your healthcare provider if you think your medicine is not helping or if you have side effects. Tell him or her if you are allergic to any medicine. Keep a list of the medicines, vitamins, and herbs you take. Include the amounts, and when and why you take them. Bring the list or the pill bottles to follow-up visits. Carry your medicine list with you in case of an emergency.      Healthy living tips: The following are general healthy guidelines. If the cause of your chest pain is known, your healthcare provider will give you specific guidelines to follow.  •Do not smoke. Nicotine and other chemicals in cigarettes and cigars can cause lung and heart damage. Ask your healthcare provider for information if you currently smoke and need help to quit. E-cigarettes or smokeless tobacco still contain nicotine. Talk to your healthcare provider before you use these products.      •Choose a variety of healthy foods as often as possible. Include fresh, frozen, or canned fruits and vegetables. Also include low-fat dairy products, fish, chicken (without skin), and lean meats. Your healthcare provider or a dietitian can help you create meal plans. You may need to avoid certain foods or drinks if your pain is caused by a digestion problem.  Healthy Foods           •Lower your sodium (salt) intake. Limit foods that are high in sodium, such as canned foods, salty snacks, and cold cuts. If you add salt when you cook food, do not add more at the table. Choose low-sodium canned foods as much as possible.             •Drink plenty of water every day. Water helps your body to control your temperature and blood pressure. Ask your healthcare provider how much water you should drink every day.      •Ask about activity. Your healthcare provider will tell you which activities to limit or avoid. Ask when you can drive, return to work, and have sex. Ask about the best exercise plan for you.      •Maintain a healthy weight. Ask your healthcare provider what a healthy weight is for you. Ask him or her to help you create a safe weight loss plan if you are overweight.      •Ask about vaccines you may need. Get the influenza (flu) vaccine every year as soon as recommended, usually in September or October. You may also need a pneumococcal vaccine to prevent pneumonia. The vaccine is usually given every 5 years, starting at age 65. Your healthcare provider can tell you if should get other vaccines, and when to get them.      Follow up with your healthcare provider within 72 hours, or as directed: You may need to return for more tests to find the cause of your chest pain. You may be referred to a specialist, such as a cardiologist or gastroenterologist. Write down your questions so you remember to ask them during your visits.

## 2021-08-09 NOTE — ED PROVIDER NOTE - CLINICAL SUMMARY MEDICAL DECISION MAKING FREE TEXT BOX
57 y/o male h/o gout, HLD, prostate cancer s/p radiation, presents with intermittent chest pain for 2 weeks. Will obtain EKG, labs, and CXR. 57 y/o male h/o gout, HLD, prostate cancer s/p radiation, presents with intermittent chest pain for 2 weeks. Will obtain EKG, labs, and CXR.    ekg nonischemic, trop neg x2, ddimer wnl, CXR unremarkable  discussed above with patient, on reval patient denies any episodes of chest pain in ED. Patient stable for discharge to followup with PMD.

## 2021-08-09 NOTE — ED PROVIDER NOTE - CARDIAC, MLM
Normal rate, regular rhythm.  Heart sounds S1, S2.  No murmurs, rubs or gallops. No pitting edema in LE.

## 2021-08-09 NOTE — ED PROVIDER NOTE - NSFOLLOWUPCLINICS_GEN_ALL_ED_FT
[facility]  [address], [address]  [place] [address]  Phone: [phone]  Fax:

## 2021-08-10 VITALS
HEART RATE: 65 BPM | SYSTOLIC BLOOD PRESSURE: 105 MMHG | RESPIRATION RATE: 18 BRPM | TEMPERATURE: 98 F | DIASTOLIC BLOOD PRESSURE: 69 MMHG | OXYGEN SATURATION: 97 %

## 2021-08-10 LAB — TROPONIN I SERPL-MCNC: <0.015 NG/ML — SIGNIFICANT CHANGE UP (ref 0–0.04)

## 2021-08-10 PROCEDURE — 36415 COLL VENOUS BLD VENIPUNCTURE: CPT

## 2021-08-10 PROCEDURE — 99283 EMERGENCY DEPT VISIT LOW MDM: CPT | Mod: 25

## 2021-08-10 PROCEDURE — 85379 FIBRIN DEGRADATION QUANT: CPT

## 2021-08-10 PROCEDURE — 80053 COMPREHEN METABOLIC PANEL: CPT

## 2021-08-10 PROCEDURE — 84484 ASSAY OF TROPONIN QUANT: CPT

## 2021-08-10 PROCEDURE — 85025 COMPLETE CBC W/AUTO DIFF WBC: CPT

## 2021-08-10 PROCEDURE — 71046 X-RAY EXAM CHEST 2 VIEWS: CPT

## 2021-08-10 PROCEDURE — 93005 ELECTROCARDIOGRAM TRACING: CPT

## 2022-02-16 NOTE — ED PROVIDER NOTE - EKG ADDITIONAL QUESTION - PERFORMED INDEPENDENT VISUALIZATION
---------------------  From: Jacey Dowd CMA (Phone Messages Pool ([id number]_[facility]))   To: Phone Messages Pool ([id number]_[place] [facility] [place]);     Sent: 2/26/2019 10:11:58 AM CST  Subject: General Message-gabapentin directions     Phone Message    PCP:   RICHARD      Time of Call:  0902       Person Calling:  Tru from   Phone number:  [phone]    Returned call at: 1005    Note:   Tru calling to see if directions on pt's gabapentin have changed.  Per directions that were sent over was 1 qhs with a qty of #270.    LM with Sherrie to c/b to verify her directions on her gabapentinPt LM at 8:12am stating she does take gabapentin 300mg qhs. Resent Rx with updated quantity of 90 caps to [facility]   Yes

## 2022-12-17 NOTE — PROGRESS NOTE ADULT - ATTENDING COMMENTS
dc planning on oral bactrim x4 weeks
[facility] [place]  Internal Medicine  [address] [place] [address]  Phone: [phone]  Fax: [phone]  Follow Up Time: 4-6 Days    
cult and sens noted  abx changed to meropenem  f/u repeat cult  ID f/u

## 2023-04-12 NOTE — ED PROVIDER NOTE - OBJECTIVE STATEMENT
Home 57 y/o male h/o HLD, gout, prostate cancer s/p radiation, presents with intermittent chest pain for 2 weeks. Reports usually the pain is in the substernal area lasting for a few hours then it resolves. Currently he is asymptomatic. Denies fever, cough, leg swelling, though he does report he has L foot swelling and pain due to his gout. Denies recent travel. Denies similar symptoms in the past. Reports he went to [facility] today and was told to come to the ER for evaluation. 55 y/o male h/o HLD, gout, prostate cancer s/p radiation, presents with intermittent chest pain for 2 weeks. Reports usually the pain is in the substernal area lasting for a few hours then it resolves. Currently he is asymptomatic. Reports pain exacerbated by cold temperature such air air conditioning. Denies fever, cough, leg swelling, though he does report he has L foot swelling and pain due to his gout. Denies recent travel. Denies similar symptoms in the past. Reports he went to [facility] today and was told to come to the ER for evaluation.

## 2023-10-10 NOTE — ED ADULT NURSE NOTE - NS ED NURSE LEVEL OF CONSCIOUSNESS AFFECT
Calm Detail Level: Simple Introduction Text (Please End With A Colon): Site: Left Hand\\nAuth: [id number]\\nPAS Procedure To Be Performed At Next Visit: Biopsy by punch method Size Of Lesion In Cm (Optional): 0 Introduction Text (Please End With A Colon): Site: neck and chest\\nAuth: [id number] Procedure To Be Performed At Next Visit: Chemical Peel TCA Other Procedure: Intralesional Kenalog

## 2023-10-30 NOTE — ED ADULT NURSE NOTE - CAS DISCH TRANSFER METHOD
Problem: MOBILITY - ADULT  Goal: Maintain or return to baseline ADL function  Description: INTERVENTIONS:  -  Assess patient's ability to carry out ADLs; assess patient's baseline for ADL function and identify physical deficits which impact ability to perform ADLs (bathing, care of mouth/teeth, toileting, grooming, dressing, etc.)  - Assess/evaluate cause of self-care deficits   - Assess range of motion  - Assess patient's mobility; develop plan if impaired  - Assess patient's need for assistive devices and provide as appropriate  - Encourage maximum independence but intervene and supervise when necessary  - Involve family in performance of ADLs  - Assess for home care needs following discharge   - Consider OT consult to assist with ADL evaluation and planning for discharge  - Provide patient education as appropriate  Outcome: Progressing  Goal: Maintains/Returns to pre admission functional level  Description: INTERVENTIONS:  - Perform BMAT or MOVE assessment daily.   - Set and communicate daily mobility goal to care team and patient/family/caregiver. - Collaborate with rehabilitation services on mobility goals if consulted  - Perform Range of Motion  times a day. - Reposition patient every  hours.   - Dangle patient  times a day  - Stand patient  times a day  - Ambulate patient  times a day  - Out of bed to chair  times a day   - Out of bed for meals   Problem: Prexisting or High Potential for Compromised Skin Integrity  Goal: Skin integrity is maintained or improved  Description: INTERVENTIONS:  - Identify patients at risk for skin breakdown  - Assess and monitor skin integrity  - Assess and monitor nutrition and hydration status  - Monitor labs   - Assess for incontinence   - Turn and reposition patient  - Assist with mobility/ambulation  - Relieve pressure over bony prominences  - Avoid friction and shearing  - Provide appropriate hygiene as needed including keeping skin clean and dry  - Evaluate need for skin moisturizer/barrier cream  - Collaborate with interdisciplinary team   - Patient/family teaching  - Consider wound care consult   Outcome: Progressing     Problem: Nutrition/Hydration-ADULT  Goal: Nutrient/Hydration intake appropriate for improving, restoring or maintaining nutritional needs  Description: Monitor and assess patient's nutrition/hydration status for malnutrition. Collaborate with interdisciplinary team and initiate plan and interventions as ordered. Monitor patient's weight and dietary intake as ordered or per policy. Utilize nutrition screening tool and intervene as necessary. Determine patient's food preferences and provide high-protein, high-caloric foods as appropriate.      INTERVENTIONS:  - Monitor oral intake, urinary output, labs, and treatment plans  - Assess nutrition and hydration status and recommend course of action  - Evaluate amount of meals eaten  - Assist patient with eating if necessary   - Allow adequate time for meals  - Recommend/ encourage appropriate diets, oral nutritional supplements, and vitamin/mineral supplements  - Order, calculate, and assess calorie counts as needed  - Recommend, monitor, and adjust tube feedings and TPN/PPN based on assessed needs  - Assess need for intravenous fluids  - Provide specific nutrition/hydration education as appropriate  - Include patient/family/caregiver in decisions related to nutrition  Outcome: Progressing     Problem: PAIN - ADULT  Goal: Verbalizes/displays adequate comfort level or baseline comfort level  Description: Interventions:  - Encourage patient to monitor pain and request assistance  - Assess pain using appropriate pain scale  - Administer analgesics based on type and severity of pain and evaluate response  - Implement non-pharmacological measures as appropriate and evaluate response  - Consider cultural and social influences on pain and pain management  - Notify physician/advanced practitioner if interventions unsuccessful or patient reports new pain  Outcome: Progressing    times a day  - Out of bed for toileting  - Record patient progress and toleration of activity level   Outcome: Progressing Private car

## 2025-01-22 NOTE — ED PROVIDER NOTE - EKG ADDITIONAL QUESTION - PERFORMED INDEPENDENT VISUALIZATION
Refill received for following medication:     Medication:   simvastatin (ZOCOR) 40 MG tablet 45 tablet 0 10/21/2024 --    Sig: Take 1/2 tab by mouth nightly      Last office visit date: 11/1/2023  Medication Refill Protocol Failed.  Protocol approved due to: identified sig mis match, same prescription.       
Yes